# Patient Record
Sex: FEMALE | Race: WHITE | NOT HISPANIC OR LATINO | Employment: OTHER | URBAN - METROPOLITAN AREA
[De-identification: names, ages, dates, MRNs, and addresses within clinical notes are randomized per-mention and may not be internally consistent; named-entity substitution may affect disease eponyms.]

---

## 2017-04-27 ENCOUNTER — ALLSCRIPTS OFFICE VISIT (OUTPATIENT)
Dept: OTHER | Facility: OTHER | Age: 72
End: 2017-04-27

## 2017-08-30 ENCOUNTER — ALLSCRIPTS OFFICE VISIT (OUTPATIENT)
Dept: OTHER | Facility: OTHER | Age: 72
End: 2017-08-30

## 2017-08-30 DIAGNOSIS — I35.9 NONRHEUMATIC AORTIC VALVE DISORDER: ICD-10-CM

## 2017-08-30 DIAGNOSIS — I44.7 LEFT BUNDLE-BRANCH BLOCK: ICD-10-CM

## 2017-08-30 DIAGNOSIS — Z95.1 PRESENCE OF AORTOCORONARY BYPASS GRAFT: ICD-10-CM

## 2017-08-30 DIAGNOSIS — I25.10 ATHEROSCLEROTIC HEART DISEASE OF NATIVE CORONARY ARTERY WITHOUT ANGINA PECTORIS: ICD-10-CM

## 2017-09-28 ENCOUNTER — GENERIC CONVERSION - ENCOUNTER (OUTPATIENT)
Dept: OTHER | Facility: OTHER | Age: 72
End: 2017-09-28

## 2017-09-28 ENCOUNTER — HOSPITAL ENCOUNTER (OUTPATIENT)
Dept: NON INVASIVE DIAGNOSTICS | Facility: CLINIC | Age: 72
Discharge: HOME | End: 2017-09-28
Payer: COMMERCIAL

## 2017-09-28 DIAGNOSIS — I44.7 LEFT BUNDLE-BRANCH BLOCK: ICD-10-CM

## 2017-09-28 DIAGNOSIS — I35.9 NONRHEUMATIC AORTIC VALVE DISORDER: ICD-10-CM

## 2017-09-28 DIAGNOSIS — I25.10 ATHEROSCLEROTIC HEART DISEASE OF NATIVE CORONARY ARTERY WITHOUT ANGINA PECTORIS: ICD-10-CM

## 2017-09-28 DIAGNOSIS — Z95.1 PRESENCE OF AORTOCORONARY BYPASS GRAFT: ICD-10-CM

## 2017-09-28 PROCEDURE — 93306 TTE W/DOPPLER COMPLETE: CPT

## 2017-10-25 NOTE — PROGRESS NOTES
Assessment  Assessed    1  Arteriosclerotic cardiovascular disease (429 2,440 9) (I25 10)   2  Abnormal glucose (790 29) (R73 09)   3  Abdominal bruit (785 9) (R09 89)   4  Hyperlipidemia (272 4) (E78 5)   5  Hypertension (401 9) (I10)   6  S/P CABG (coronary artery bypass graft) (V45 81) (Z95 1)   7  Left bundle-branch block (426 3) (I44 7)    Plan  Aortic valve disorder, Arteriosclerotic cardiovascular disease, Left bundle-branch block,  S/P CABG (coronary artery bypass graft)    · ECHO COMPLETE WITH CONTRAST IF INDICATED; Status:Need Information - Financial  Authorization; Requested for:[id number];    Perform:[facility]; Due:[id number]; Ordered; For:Aortic valve disorder, Arteriosclerotic cardiovascular disease, Left bundle-branch block, S/P CABG (coronary artery bypass graft); Ordered By:Chris Yeboah; Unlinked    · Aspirin  MG Oral Tablet Delayed Release   Dispense: 0 Days ; #:0 Tablet Delayed Release; Refill: 0; MARIANA = Y; Record; Last Updated By: Miladis Mcguire; 8/30/2017 11:02:23 AM    Discussion/Summary  Cardiology Discussion Summary Free Text Note Form [facility]:   Patient is cardiovascularly stable  No CHF, angina or symptomatic ectopy   Has asymptomatic left bundle branch block reviewed may-2016 stress jenifer-t which showed no provokable ischemia no significant arrhythmia normal LV contractile function--- recent chest x-ray described marked cardiomegaly will check echo to assess chamber sizes as well as aortic murmurrecent vascular study demonstrating 50-75% stenosis in the aorta-with no aneurysm, normal ABIs bilaterally 60% right renal stenosis-continue risk factor modification patient has no claudication symptomsdoes suffer with anxiety and depression-and has been advised to consider Paxil many times in the past which she has not done--she will discuss this with Dr Norah Kelly monitor her blood pressure sugars regular exercise reduce carbohydrate ingestion  weight regular exercisestudies reviewed with patient, medications reviewed and possible side effects discussed  Continue present medical regimen as prescribed  Continue risk factor modification  All questions answered  Safety measures reviewed  Patient advised to report any problems prompting medical attention  Discussed concepts of atherosclerosis, signs and symptoms of cardiac disease  Optimize weight, regular exercise and follow up with appropriate specialists as discussed  Return for follow up visit in 4 months or earlier if needed  Reviewed concepts of valvular heart disease arrhythmia signs and symptoms of CAD CHF all questions answered safety measures reviewed routine follow up  up with appropriate specialists  Follows with PCP, Dr Elisha Odonnell with neurology for tremor if desired regular blood tests and cancer testing with Dr Slime Pace monitoring sugars lipids etc  follow-up with GI with gastritis safety measures reviewed weight loss reviewed emotional support provided  risk factor modification strategies reminded patient to avoid excess carbohydrates follow-up with Dr Slime Pace closely for monitoring sugars and lipids reviewed signs and symptoms of heart disease in detail patient will report any problems promptly to medical attention      Counseling Documentation With Imm: The patient was counseled regarding  Chief Complaint  Chief Complaint Free Text Note Form: Patient presents with follow up cardiovascular evaluation  Known history of CAHD-status post bypass, HTN, HPL and LBBB, Stenosis of the aortic bifurcation asymptomatic     Chief Complaint Chronic Condition [facility]: Patient is here today for follow up of chronic conditions described in HPI  History of Present Illness  Cardiology HPI Free Text Note Form [facility]: Patient is cardiovascular stable--, asymptomatic and active    No angina, CHF, palpitations, syncope, seizures, shortness of breath wheezes hemoptysis weight loss anorexia cough CVA/TIA, dizziness, lightheadedness, amaurosis, orthostasis, claudication myositis or edema  No urinary or bowel complaints  No rashes, fevers, arrhythmic symptoms, or thromboembolic symptoms  No PND  Had previous ultrasound of her aorta 2016 demonstrating 50-75% asymptomatic stenosis of the aortic bifurcation with normal ABIs and asymptomatic 60% right renal stenosis-patient denies claudication  does have arthritis of her back was put on Fosamax for osteoporosis by PCP-  followed by Dr Adriane Magana and had recent blood tests April 2017 with A1c of 6 4 cholesterol 147 LDL 61 triglycerides 231 renal function LFTs look normal had CAT scan of the chest by Dr Adriane Magana recently-demonstrating 1 2 cm lymph node-with no pulmonary masses--patient was sent to pulmonary Dr Yusra Ron is following this up--- she also had chest x-ray showing marked cardiomegaly-will be checking echo for LV size with history of CAD as well as to evaluate aortic murmur          Had May 2016 stress echo-with no provokable ischemia and no significant arrhythmia No urinary or bowel complaints  No rashes, fevers, arrhythmic symptoms, or thromboembolic symptoms  No PND  Having some chronic anxiety and depression about the obituaries- too many young people dying, won't go to [place] because too many young people dying- reminded to get prescription of Paxil that was offered by Dr Adriane Magana if needed  has a long-standing history of anxiety and depressive tendencies--had been prescribed Paxil the past but was too afraid to try it- concepts of antidepressants were reviewed in detail patient will discuss t this medicine from her PCP Dr Adriane Magana  has history of left bundle branch blockno history of bradycardia or heart block -- no arrhythmic symptoms syncope lightheadedness dizziness exercise intolerance physically active  is status post cholecystectomy  reports that her [de-identified] yo brother had CABG in [place]  She also reports that her younger sister recently had AAA repair   Patient had no aortic aneurysm but asymptomatic stenosis of aortic bifurcation was normal ABIs  in 2012 noted gastritis  Patient followsup with GI  Recent EGD in 2016 with gastritis on PPIs    with Dr Jean Pierre Tipton for medical evaluations had recent lab tests -including good cholesterol sugars are also monitored which were good, saw Dr Duane Muslim with diagnosis of morphea  of CAD, status post bypass, , history of left bundle branch block- no arrhythmic or thromboembolic symptoms no CHF PND edema orthopnea no arrhythmic symptoms dizziness lightheadedness bradycardia thromboembolism claudication orthostasis myositis PND edema orthopneatest  showed no ischemia  Patient had echo stress test 2014 with no ischemia  of hyperglycemia discussed dietary restriction of carbohydrates  No polyuria, polydipsia Blood pressures been good  Not smoking  but former  No wheezes, hemoptysis, night sweats CAT scan of the chest in  with calcification of the coronary arteries  , with trace, MR, TR aortic gradient 5 EF 55%  No CHF  [ CAD, status post bypass, , history of left bundle branch block, Anxiety, depression, hypertension, hyperlipidemia bypass, COPD, DJD, tremor, diabetes, cholecystectomy, appendectomy, hysterectomy ]  [ Positive family CAD ]  [ No alcohol abuse  Not presently smoking  Stopped in  No drug abuse  quit approximately 10 years ]  of hyperglycemia in the past recent hemoglobin A1c 6 4 normal patient is watching her carbohydrates- and weight -reminded to get regular ophthalmology podiatry followup no polyuria polydipsia- in followup with Dr Jean Pierre Tipton  was previously seen by Dr Priscille Seip does    15 years ago close with her daughter  tolerating aspirin to 81 mg with history of   GI upset no bleedingclaudication with exercise      Review of Systems  Cardiology Female ROS:     Cardiac: No complaints of chest pain, no palpitations, no fainting -- and-- as noted in HPI     Skin: No complaints of nonhealing sores or skin rash  Genitourinary: No complaints of recurrent urinary tract infections, frequent urination at night, difficult urination, blood in urine, kidney stones, loss of bladder control, kidney problems, denies any birth control or hormone replacement, is not post menopausal, not currently pregnant  Psychological: anxiety, but-- as noted in HPI-- and-- no depression  General: No complaints of trouble sleeping, lack of energy, fatigue, appetite changes, weight changes, fever, frequent infections, or night sweats  Respiratory: No complaints of shortness of breath, cough with sputum, or wheezing  HEENT: No complaints of serious problems, hearing problems, nose problems, throat problems, or snoring  Gastrointestinal: as noted in HPI-- and-- no heartburn-- History of gastritis  Hematologic: No complaints of bleeding disorders, anemia, blood clots, or excessive brusing  Neurological: No complaints of numbness, tingling, dizziness, weakness, seizures, headaches, syncope or fainting, AM fatigue, daytime sleepiness, no witnessed apnea episodes  Musculoskeletal: arthritis      Active Problems  Problems    1  Abdominal bruit (785 9) (R09 89)   2  Abdominal pain, epigastric (789 06) (R10 13)   3  Abnormal glucose (790 29) (R73 09)   4  Anxiety (300 00) (F41 9)   5  Anxiety disorder (300 00) (F41 9)   6  Aortic valve disorder (424 1) (I35 9)   7  Arteriosclerotic cardiovascular disease (429 2,440 9) (I25 10)   8  Atherosclerosis of coronary artery bypass graft without angina pectoris (414 05) (I25 810)   9  Benign essential hypertension (401 1) (I10)   10  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)   11  Depression (311) (F32 9)   12  Dyspepsia (536 8) (R10 13)   13  Dyspnea on exertion (786 09) (R06 09)   14  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)   15  Gastritis (535 50) (K29 70)   16  Hypercholesterolemia (272 0) (E78 00)   17  Hyperlipidemia (272 4) (E78 5)   18   Hypertension (401  9) (I10)   19  Left bundle-branch block (426 3) (I44 7)   20  Mild nausea (787 02) (R11 0)   21  Mitral valve disorder (424 0) (I05 9)   22  S/P CABG (coronary artery bypass graft) (V45 81) (Z95 1)   23  Tremor (781 0) (R25 1)    Past Medical History  Problems    1  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)   2  History of Chest pain (786 50) (R07 9)   3  History of COPD (chronic obstructive pulmonary disease) (496) (J44 9)   4  History of Coronary artery disease (414 00) (I25 10)   5  History of DJD (degenerative joint disease) (715 90) (M19 90)   6  History of Gastroduodenitis (535 50)   7  History of aortic valve disorder (V12 59) (Z86 79)   8  History of chest pain (V13 89) (Z87 898)   9  History of chronic obstructive lung disease (V12 69) (Z87 09)   10  History of gastritis (V12 79) (Z87 19)   11  History of heartburn (V12 79) (Z87 898)   12  History of hematuria (V13 09) (Z87 448)   13  History of nausea (V12 79) (Z87 898)   14  History of shortness of breath (V13 89) (Z87 898)   15  Hyperlipidemia (272 4) (E78 5)   16  Hypertension (401 9) (I10)   17  History of LBBB (left bundle branch block) (426 3) (I44 7)   18  History of Lymph Nodes Enlarged (785 6)   19  History of Need for prophylactic vaccination and inoculation against influenza (V04 81)    (Z23)   20  History of Osteoporosis (733 00) (M81 0)   21  History of Type 2 Diabetes Mellitus - Uncomplicated, Controlled (250 00)  Active Problems And Past Medical History Reviewed: The active problems and past medical history were reviewed and updated today  Surgical History  Problems    1  History of Appendectomy   2  History of CABG   3  History of Cardiac Cath Procedure Outcome: Successful   4  History of Cholecystectomy   5  History of Hysterectomy  Surgical History Reviewed: The surgical history was reviewed and updated today  Family History  Sister    1  Family history of abdominal aortic aneurysm (V17 49) (Z82 49)  Brother    2   Family history of Carotid artery disease  Family History    3  Family history of Coronary Artery Disease (V17 49)  Family History Reviewed: The family history was reviewed and updated today  Social History  Problems    · Denied: History of Alcohol Use (History)   · Unknown If Ever Smoked  Social History Reviewed: The social history was reviewed and updated today  The social history was reviewed and is unchanged  Current Meds   1  Alendronate Sodium 70 MG Oral Tablet; TAKE 1 TABLET ONCE WEEKLY Recorded   2  Aspir-81 81 MG Oral Tablet Delayed Release; Take 1 tablet daily Recorded   3  Aspirin  MG Oral Tablet Delayed Release; Take 1 tablet daily Recorded   4  Atenolol 50 MG Oral Tablet; take one tablet by mouth every day; Therapy: 33RYA3305 to (Evaluate:69Scg9145)  Requested for: 11ISI2013; Last   Rx:[id number] Ordered   5  Lisinopril-Hydrochlorothiazide 20-25 MG Oral Tablet; Take 1 tablet daily Recorded   6  LORazepam 1 MG Oral Tablet (Ativan); TAKE 1 TO 2 TABLET BY MOUTH EVERY DAY AS   NEEDED; Therapy: 65HLA2458 to (77 725 753)  Requested for: 01Tbs9539; Last   Rx:[id number] Ordered   7  Omega 3 1200 MG Oral Capsule; Take as directed Recorded   8  Simvastatin 40 MG Oral Tablet; Take 1 tablet daily Recorded  Medication List Reviewed: The medication list was reviewed and updated today  Allergies  Medication    1  No Known Drug Allergies    Vitals  Vital Signs    Recorded: 06Qur1653 11:02AM   Heart Rate 64   Systolic 177   Diastolic 70   Height 5 ft 1 in   Weight 141 lb 3 04 oz   BMI Calculated 26 68   BSA Calculated 1 63     Physical Exam    Constitutional   General appearance: No acute distress, well appearing and well nourished  -- No acute distress anxious well-developed well-nourished female  Eyes   Conjunctiva and Sclera examination: Conjunctiva pink, sclera anicteric  Ears, Nose, Mouth, and Throat - External inspection of ears and nose: Normal without deformities or discharge  -- Nasal mucosa, septum, and turbinates: Normal, no edema or discharge  -- Oropharynx: Clear, nares are clear, mucous membranes are moist    Neck   Neck and thyroid: Normal, supple, trachea midline, no thyromegaly  Pulmonary   Respiratory effort: No increased work of breathing or signs of respiratory distress  Auscultation of lungs: Clear to auscultation, no rales, no rhonchi, no wheezing, good air movement  Cardiovascular   Palpation of heart: Normal PMI, no thrills  Auscultation of heart: Abnormal  -- Split S2  2/6 ASHLY LSB and apex  Carotid pulses: Normal, 2+ bilaterally  Abdominal aorta: Abnormal  -- lower abdominal aortic bruit  Femoral pulses: Abnormal  -- Bilateral femoral bruits pulses +2+2  Pedal pulses: Normal, 2+ bilaterally  Examination of extremities for edema and/or varicosities: Normal     Chest -   Abnormal  -- Well-healed median sternotomy  Abdomen   Abdomen: Non-tender and no distention  Liver and spleen: No hepatomegaly or splenomegaly  Musculoskeletal Gait and station: Normal gait  -- Digits and nails: Normal without clubbing or cyanosis  -- Inspection/palpation of joints, bones, and muscles: Normal, ROM normal     Skin - Skin and subcutaneous tissue: Normal without rashes or lesions  Skin is warm and well perfused, normal turgor  Neurologic - Speech: Normal     Psychiatric - Orientation to person, place, and time: Normal -- Mood and affect: Normal    Additional Findings - Mild tremor noted  Health Management  Health Maintenance   Influenza (Split); every 1 year; Last 37MZM2614; Next Due: 46GGL3519;  Overdue    Signatures   Electronically signed by : JAIR Saha ; Aug 30 2017 11:35AM EST                       (Author)

## 2018-01-09 NOTE — RESULT NOTES
Verified Results  ECHO COMPLETE WITH CONTRAST IF INDICATED 2017 08:54AM Thyra  Order Number: [id number]    - Patient Instructions: To schedule this appointment, please contact Central Scheduling at [phone]  Test Name Result Flag Reference   ECHO COMPLETE WITH CONTRAST IF INDICATED (Report)     [address] [facility], [address] [facility]   [phone]     Transthoracic Echocardiogram   2D, M-mode, and Color Doppler     Study date: 28-Sep-2017     Patient: Janie West   MR number: [id number]   Account number: [de-identified]   : 1945   Age: 74 years   Gender: Female   Status: Outpatient   Location: [facility]   Height: 61 in   Weight: 141 lb   BP: 138/ 70 mmHg     Indications: CAD  Diagnoses: I25 10 - Atherosclerotic heart disease of native coronary artery without angina pectoris     Sonographer: Richey RCS   Interpreting Physician: Patricio Espitia MD   Primary Physician: Aneta Weaver MD   Referring Physician: Andie Kaplan MD   Group: [facility]     SUMMARY     LEFT VENTRICLE:   Systolic function was normal  Ejection fraction was estimated in the range of 55 % to 65 %  There were no regional wall motion abnormalities  Doppler parameters were consistent with abnormal left ventricular relaxation (grade 1 diastolic dysfunction)  MITRAL VALVE:   There was mild regurgitation  AORTIC VALVE:   There was trace regurgitation  TRICUSPID VALVE:   There was mild regurgitation  PULMONIC VALVE:   There was mild regurgitation  HISTORY: PRIOR HISTORY: Left bundle branch block  Hyperlipidemia  Risk factors: hypertension, medication-treated hypercholesterolemia, and a family history of coronary artery disease  Chronic lung disease  PROCEDURE: The study was performed in the [address] [place] [address]  This was a routine study  The transthoracic approach was used  The study included complete 2D imaging, M-mode, and color Doppler  The heart rate was 93 bpm, at the   start of the study  Images were obtained from the parasternal, apical, subcostal, and suprasternal notch acoustic windows  Image quality was adequate  LEFT VENTRICLE: Size was normal  Systolic function was normal  Ejection fraction was estimated in the range of 55 % to 65 %  There were no regional wall motion abnormalities  Wall thickness was normal  DOPPLER: Doppler parameters were   consistent with abnormal left ventricular relaxation (grade 1 diastolic dysfunction)  RIGHT VENTRICLE: The size was normal  Systolic function was normal  Wall thickness was normal      LEFT ATRIUM: Size was normal      RIGHT ATRIUM: Size was normal      MITRAL VALVE: Valve structure was normal  There was normal leaflet separation  DOPPLER: The transmitral velocity was within the normal range  There was no evidence for stenosis  There was mild regurgitation  AORTIC VALVE: The valve was trileaflet  Leaflets exhibited normal thickness and normal cuspal separation  DOPPLER: Transaortic velocity was within the normal range  There was no evidence for stenosis  There was trace regurgitation  TRICUSPID VALVE: The valve structure was normal  There was normal leaflet separation  DOPPLER: The transtricuspid velocity was within the normal range  There was no evidence for stenosis  There was mild regurgitation  PULMONIC VALVE: Leaflets exhibited normal thickness, no calcification, and normal cuspal separation  DOPPLER: The transpulmonic velocity was within the normal range  There was mild regurgitation  PERICARDIUM: There was no pericardial effusion  The pericardium was normal in appearance  AORTA: The root exhibited normal size  SYSTEMIC VEINS: IVC: The inferior vena cava was normal in size and course   Respirophasic changes were normal      SYSTEM MEASUREMENT TABLES     Apical four chamber   4 chamber Left Atrium Volume Index; Planimetry; End Systole; Apical four chamber;: 12 71 cm2   Left Ventricular Diastolic Area; Method of Disks, Single Plane; End Diastole; Apical four chamber;: 24 72 cm2   Left Ventricular Ejection Fraction; Method of Disks, Single Plane; Apical four chamber;: 62 2 %   Left Ventricular systolic Area; Method of Disks, Single Plane; End Systole; Apical four chamber;: 12 99 cm2   Right Atrium Systolic Area; Planimetry; End Systole; Apical four chamber;: 12 63 cm2   Right Ventricular Internal Diastolic Dimension; End Diastole; Apical four chamber;: 26 mm   TAPSE: 15 1 mm     Unspecified Scan Mode   Aortic Root Diameter; End Systole;: 30 5 mm   Aortic valve Area; Continuity Equation by Velocity Time Integral; Systole;: 2 65 cm2   Cardiovascular Orifice Diameter; End Systole;: 19 7 mm   Gradient Pressure, Peak; Simplified Bernoulli; Antegrade Flow; Systole;: 8 5 mm[Hg]   Gradient pressure, average; Simplified Bernoulli; Antegrade Flow; Systole;: 4 7 mm[Hg]   Left Atrium to Aortic Root Ratio;: 1 2   Left atrial diameter; End Diastole;: 36 5 mm   Cardiac Output; Teichholz; Systole;: 2 21 L/min   Heart rate; Teichholz;: 86 {H  B }/min   Interventricular Septum Diastolic Thickness; Teichholz; End Diastole;: 8 9 mm   Left Ventricle Internal End Diastolic Dimension; Teichholz;: 32 5 mm   Left Ventricle Internal Systolic Dimension; Teichholz; End Systole;: 22 3 mm   Left Ventricle Mass; Mass AVCube with Teichholz; End Diastole;: 84 g   Left Ventricle Posterior Wall Diastolic Thickness; Teichholz; End Diastole;: 9 8 mm   Left Ventricular Ejection Fraction; Teichholz;: 60 5 %   Left Ventricular End Diastolic Volume; Teichholz;: 42 5 ml   Left Ventricular End Systolic Volume; Teichholz;: 16 8 ml   Left Ventricular Fractional Shortening;: 31 4 %   Stroke volume;  Teichholz; Systole;: 25 7 ml   Mitral Valve Area; Area by Pressure Half-Time; Systole;: 5 cm2   Mitral Valve E to A Ratio; Systole;: 0 67   Pressure half time; Diastole;: 0 04 s   Maximum Tricuspid valve regurgitation pressure gradient; Regurgitant Flow; Systole;: 23 2 mm[Hg]     [facility]     Prepared and electronically signed by     Curt Ferro MD   Signed [id number] 17:54:05

## 2018-01-12 VITALS
BODY MASS INDEX: 26.66 KG/M2 | WEIGHT: 141.19 LBS | HEART RATE: 64 BPM | HEIGHT: 61 IN | SYSTOLIC BLOOD PRESSURE: 138 MMHG | DIASTOLIC BLOOD PRESSURE: 70 MMHG

## 2018-01-13 VITALS
DIASTOLIC BLOOD PRESSURE: 64 MMHG | SYSTOLIC BLOOD PRESSURE: 120 MMHG | HEART RATE: 64 BPM | HEIGHT: 61 IN | BODY MASS INDEX: 27.38 KG/M2 | WEIGHT: 145 LBS

## 2018-01-23 ENCOUNTER — ALLSCRIPTS OFFICE VISIT (OUTPATIENT)
Dept: OTHER | Facility: OTHER | Age: 73
End: 2018-01-23

## 2018-01-24 NOTE — PROGRESS NOTES
Assessment  Assessed    1  Arteriosclerotic cardiovascular disease (429 2,440 9) (I25 10)   2  S/P CABG (coronary artery bypass graft) (V45 81) (Z95 1)   3  Hypertension (401 9) (I10)   4  Hyperlipidemia (272 4) (E78 5)   5  Left bundle-branch block (426 3) (I44 7)   6  Diastolic dysfunction (030 1) (I51 9)   7  Mitral regurgitation (424 0) (I34 0)   8  Tricuspid regurgitation (397 0) (I07 1)   9  Renal artery stenosis (440 1) (I70 1)    Discussion/Summary  Cardiology Discussion Summary Free Text Note Form [facility]:   Patient with multiple medical problems who seems to be doing reasonably well from cardiac standpoint  Continue aggressive risk factor modification and therapeutic lifestyle changes  Low salt, low calorie, low fat, low cholesterol diet with regular exercise and to optimize weight  Previous studies were reviewed  Medications were reviewed and potential side effects discussed  Discussed concepts of atherosclerosis, including signs and symptoms of cardiac disease  Safety measures were reviewed  Questions were entertained and answered  Patient was advised to report any problem requiring medical attention  Follow up with appropriate specialists and lab work as discussed  Return for follow up visit as scheduled or earlier, if needed  Thank you for allowing me to participate in the care and evaluation of your patient  Should you have any questions, please feel free to contact me  Counseling Documentation With Imm: The patient was counseled regarding  Chief Complaint  Chief Complaint Chronic Condition [facility]: Patient is here today for follow up of chronic conditions described in HPI  History of Present Illness  Cardiology HPI Free Text Note Form [facility]: Patient is cardiovascular stable, asymptomatic and active   Denies chest pain /pressure / tightness, palpitations, lightheadedness, syncope, SOB, swelling feet, orthopnea, PND    Had ultrasound of her aorta July 2016 demonstrating 50-75% stenosis of the aortic bifurcation with normal ABIs and 60% right renal stenosis  Patient denies claudication    Had May 2016 stress echo with no provocable ischemia and no significant arrhythmia    Patient has a long-standing history of anxiety and depressive tendencies--had been prescribed Paxil in the past    Patient has history of left bundle branch block, no history of bradycardia or heart block no arrhythmic symptoms syncope lightheadedness dizziness exercise intolerance     CAD S/P CABG in 1989 - stable and doing well      Review of Systems  Cardiology Female ROS:     Cardiac: No complaints of chest pain, no palpitations, no fainting  and as noted in HPI  Skin: No complaints of nonhealing sores or skin rash  Genitourinary: No complaints of recurrent urinary tract infections, frequent urination at night, difficult urination, blood in urine, kidney stones, loss of bladder control, kidney problems, denies any birth control or hormone replacement, is not post menopausal, not currently pregnant  Psychological: anxiety, but as noted in HPI and no depression  General: No complaints of trouble sleeping, lack of energy, fatigue, appetite changes, weight changes, fever, frequent infections, or night sweats  Respiratory: No complaints of shortness of breath, cough with sputum, or wheezing  HEENT: No complaints of serious problems, hearing problems, nose problems, throat problems, or snoring  Gastrointestinal: as noted in HPI and no heartburn History of gastritis  Hematologic: No complaints of bleeding disorders, anemia, blood clots, or excessive brusing  Neurological: No complaints of numbness, tingling, dizziness, weakness, seizures, headaches, syncope or fainting, AM fatigue, daytime sleepiness, no witnessed apnea episodes  Musculoskeletal: arthritis       ROS Reviewed:   ROS reviewed  Active Problems  Problems    1  Abdominal bruit (785 9) (R09 89)   2   Abdominal pain, epigastric (279 06) (R10 13)   3  Abnormal glucose (790 29) (R73 09)   4  Anxiety (300 00) (F41 9)   5  Anxiety disorder (300 00) (F41 9)   6  Aortic valve disorder (424 1) (I35 9)   7  Arteriosclerotic cardiovascular disease (429 2,440 9) (I25 10)   8  Atherosclerosis of coronary artery bypass graft without angina pectoris (414 05) (I25 810)   9  Benign essential hypertension (401 1) (I10)   10  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)   11  Depression (311) (F32 9)   12  Dyspepsia (536 8) (R10 13)   13  Dyspnea on exertion (786 09) (R06 09)   14  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)   15  Hypercholesterolemia (272 0) (E78 00)   16  Hyperlipidemia (272 4) (E78 5)   17  Hypertension (401 9) (I10)   18  Left bundle-branch block (426 3) (I44 7)   19  Mild nausea (787 02) (R11 0)   20  Mitral valve disorder (424 0) (I05 9)   21  S/P CABG (coronary artery bypass graft) (V45 81) (Z95 1)   22  Tremor (781 0) (R25 1)    Past Medical History  Problems    1  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)   2  History of Chest pain (786 50) (R07 9)   3  History of COPD (chronic obstructive pulmonary disease) (496) (J44 9)   4  History of Coronary artery disease (414 00) (I25 10)   5  History of DJD (degenerative joint disease) (715 90) (M19 90)   6  History of Gastroduodenitis (535 50)   7  History of aortic valve disorder (V12 59) (Z86 79)   8  History of chest pain (V13 89) (Z87 898)   9  History of chronic obstructive lung disease (V12 69) (Z87 09)   10  History of gastritis (V12 79) (Z87 19)   11  History of gastritis (V12 79) (Z87 19)   12  History of heartburn (V12 79) (Z87 898)   13  History of hematuria (V13 09) (Z87 448)   14  History of nausea (V12 79) (Z87 898)   15  History of shortness of breath (V13 89) (Z87 898)   16  Hyperlipidemia (272 4) (E78 5)   17  Hypertension (401 9) (I10)   18  History of LBBB (left bundle branch block) (426 3) (I44 7)   19  History of Lymph Nodes Enlarged (785 6)   20   History of Need for prophylactic vaccination and inoculation against influenza (V04 81)    (Z23)   21  History of Osteoporosis (733 00) (M81 0)   22  History of Type 2 Diabetes Mellitus - Uncomplicated, Controlled (250 00)  Active Problems And Past Medical History Reviewed: The active problems and past medical history were reviewed and updated today  Surgical History  Problems    1  History of Appendectomy   2  History of CABG   3  History of Cardiac Cath Procedure Outcome: Successful   4  History of Cholecystectomy   5  History of Hysterectomy  Surgical History Reviewed: The surgical history was reviewed and updated today  Family History  Sister    1  Family history of abdominal aortic aneurysm (V17 49) (Z82 49)  Brother    2  Family history of Carotid artery disease  Family History    3  Family history of Coronary Artery Disease (V17 49)  Family History Reviewed: The family history was reviewed and updated today  Social History  Problems    · Denied: History of Alcohol Use (History)   · Unknown If Ever Smoked  Social History Reviewed: The social history was reviewed and updated today  The social history was reviewed and is unchanged  Current Meds   1  Aspir-81 81 MG Oral Tablet Delayed Release; Take 1 tablet daily Recorded   2  Atenolol 50 MG Oral Tablet; take one tablet by mouth every day; Therapy: [id number] to (Lilia Irwin)  Requested for: 64WQL9245; Last   Rx:27Nov2017; Status: ACTIVE - Retrospective By Protocol Authorization, Transmit to   Pharmacy - Awaiting Verification Ordered   3  Lisinopril-Hydrochlorothiazide 20-25 MG Oral Tablet; Take 1 tablet daily Recorded   4  LORazepam 1 MG Oral Tablet (Ativan); TAKE 1 TO 2 TABLET BY MOUTH EVERY DAY AS   NEEDED; Therapy: 67YUZ2823 to ([id number])  Requested for: 08CWU5069; Last   Rx:11Oct2017 Ordered   5  Omega 3 1200 MG Oral Capsule; Take as directed Recorded   6  Simvastatin 40 MG Oral Tablet;  Take 1 tablet daily Recorded  Medication List Reviewed: The medication list was reviewed and updated today  Allergies  Medication    1  No Known Drug Allergies    Vitals  Vital Signs    Recorded: 62XHZ3068 10:30AM   Heart Rate 58   Systolic 268   Diastolic 80   Height 5 ft 1 in   Weight 142 lb    BMI Calculated 26 83   BSA Calculated 1 63   O2 Saturation 98     Physical Exam    Constitutional   General appearance: No acute distress, well appearing and well nourished  Anxious  Eyes   Conjunctiva and Sclera examination: Conjunctiva pink, sclera anicteric  Ears, Nose, Mouth, and Throat - External inspection of ears and nose: Normal without deformities or discharge  Oropharynx: Clear, nares are clear, mucous membranes are moist    Neck   Neck and thyroid: Normal, supple, trachea midline, no thyromegaly  Pulmonary   Respiratory effort: No increased work of breathing or signs of respiratory distress  Auscultation of lungs: Clear to auscultation, no rales, no rhonchi, no wheezing, good air movement  Cardiovascular   Palpation of heart: Normal PMI, no thrills  Auscultation of heart: Normal rate and rhythm, normal S1 and S2, no murmurs  Carotid pulses: Normal, 2+ bilaterally  Abdominal aorta: Abnormal   lower abdominal aortic bruit  Femoral pulses: Abnormal   Bilateral femoral bruits pulses +2+2  Pedal pulses: Normal, 2+ bilaterally  Examination of extremities for edema and/or varicosities: Normal     Chest -   Abnormal   Well-healed median sternotomy  Abdomen   Abdomen: Non-tender and no distention  Liver and spleen: No hepatomegaly or splenomegaly  Musculoskeletal Gait and station: Normal gait  Digits and nails: Normal without clubbing or cyanosis  Skin - Skin and subcutaneous tissue: Normal without rashes or lesions  Skin is warm and well perfused, normal turgor      Neurologic - Speech: Normal     Psychiatric - Orientation to person, place, and time: Normal  Mood and affect: Normal    Additional Findings - Mild tremor noted  Health Management  Health Maintenance   Influenza (Split); every 1 year; Last [id number]; Next Due: [id number];  Overdue    Signatures   Electronically signed by : JAIR Cruz ; Jan 23 2018 11:05AM EST                       (Author)

## 2018-05-29 ENCOUNTER — TELEPHONE (OUTPATIENT)
Dept: CARDIOLOGY CLINIC | Facility: CLINIC | Age: 73
End: 2018-05-29

## 2018-05-29 NOTE — TELEPHONE ENCOUNTER
Answering service call-       Message # [phone] 2018 07:37p [NM]   TO: Deepthi Lewis Ogilvie Name: Jolene Meier   PHONE: [phone]   ----------------------------------------------------------------------   Malcom   Pt Jovanni Carias   :41   Emerg?:Yes   Msg:Took the wrong med & a double dose & needs to know what   she should do next     (Message Delivered)   ------------ SUNITA Parks - U P------------- :   2018 07:40p NM   Paged pt info to Dr Yisel Santiago @ 7:40p

## 2018-06-22 RX ORDER — SIMVASTATIN 40 MG
1 TABLET ORAL DAILY
COMMUNITY
End: 2020-11-19 | Stop reason: SDUPTHER

## 2018-06-22 RX ORDER — LISINOPRIL AND HYDROCHLOROTHIAZIDE 25; 20 MG/1; MG/1
1 TABLET ORAL
COMMUNITY
Start: 2018-01-22 | End: 2020-10-27

## 2018-06-22 RX ORDER — ATENOLOL 50 MG/1
50 TABLET ORAL
COMMUNITY
Start: 2018-02-14

## 2018-06-22 RX ORDER — LORAZEPAM 1 MG/1
1 TABLET ORAL
COMMUNITY
Start: 2018-05-14 | End: 2022-04-14 | Stop reason: ALTCHOICE

## 2018-06-22 RX ORDER — CALCIUM CARBONATE/VITAMIN D3 600 MG-10
TABLET ORAL
COMMUNITY

## 2018-06-22 RX ORDER — ASPIRIN 81 MG/1
1 TABLET ORAL DAILY
COMMUNITY

## 2018-07-19 ENCOUNTER — OFFICE VISIT (OUTPATIENT)
Dept: GASTROENTEROLOGY | Facility: CLINIC | Age: 73
End: 2018-07-19
Payer: COMMERCIAL

## 2018-07-19 VITALS
WEIGHT: 138 LBS | DIASTOLIC BLOOD PRESSURE: 70 MMHG | HEART RATE: 64 BPM | BODY MASS INDEX: 26.06 KG/M2 | HEIGHT: 61 IN | SYSTOLIC BLOOD PRESSURE: 148 MMHG

## 2018-07-19 DIAGNOSIS — R13.10 DYSPHAGIA, UNSPECIFIED TYPE: Primary | ICD-10-CM

## 2018-07-19 PROCEDURE — 99213 OFFICE O/P EST LOW 20 MIN: CPT | Performed by: INTERNAL MEDICINE

## 2018-07-19 RX ORDER — MELATONIN
1000 DAILY
COMMUNITY

## 2018-07-19 RX ORDER — FAMOTIDINE 20 MG/1
20 TABLET, FILM COATED ORAL 2 TIMES DAILY
Qty: 90 TABLET | Refills: 3 | Status: SHIPPED | OUTPATIENT
Start: 2018-07-19 | End: 2019-05-01

## 2018-07-19 NOTE — PROGRESS NOTES
[facility]      Chief Complaint: dysphagia    HPI:  Rodriguez Ortiz is a 74 y o   female who presents today with dysphagia  She believes that her allergies are exacerbating her difficulty swallowing and she is experiencing postnasal drip  She reports that there are times when her food gets stuck in her chest  A few years ago we did perform an EGD which was unremarkable  Denies losing weight, loss of appetite, chest pain, black stools  She has been eating well and has gained weight  There are no alarm symptoms present  She has been under a lot of stress with her daughter's diagnosis of breast cancer  Review of Systems:   Constitutional: No fever or chills, feels well, no tiredness, no recent weight gain or weight loss  HENT: No complaints of earache, no hearing loss, no nosebleeds, no nasal discharge, no sore throat, no hoarseness  Eyes: No complaints of eye pain, no red eyes, no discharge from eyes, no itchy eyes  Cardiovascular: No complaints of slow heart rate, no fast heart rate, no chest pain, no palpitations, no leg claudication, no lower extremity edema  Respiratory: No complaints of shortness of breath, no wheezing, no cough, no SOB on exertion, no orthopnea  Gastrointestinal: As noted in HPI  Genitourinary: No complaints of dysuria, no incontinence, no hesitancy, no nocturia  Musculoskeletal: No complaints of arthralgia, no myalgias, no joint swelling or stiffness, no limb pain or swelling  Neurological: No complaints of headache, no confusion, no convulsions, no numbness or tingling, no dizziness or fainting, no limb weakness, no difficulty walking  Skin: No complaints of skin rash or skin lesions, no itching, no skin wound, no dry skin  Hematological/Lymphatic: No complaints of swollen glands, does not bleed easy  Allergic/Immunologic: No immunocompromised state  Endocrine:  No complaints of polyuria, no polydipsia     Psychiatric/Behavioral: is not suicidal, no sleep disturbances, depression, no change in personality, no emotional problems  Positive for anxiety  Historical Information   Past Medical History:   Diagnosis Date    Abdominal bruit     Anxiety     Aortic valve disorder     Depression     HTN (hypertension)     Hyperlipidemia     LBBB (left bundle branch block)     Mitral valve disorder     S/P CABG (coronary artery bypass graft)     Tremor     Tricuspid regurgitation      Past Surgical History:   Procedure Laterality Date    APPENDECTOMY      CARDIAC CATHETERIZATION      CHOLECYSTECTOMY      CORONARY ARTERY BYPASS GRAFT      HYSTERECTOMY       Social History   History   Alcohol use Not on file     History   Drug use: Unknown     History   Smoking Status    Not on file   Smokeless Tobacco    Not on file     No family history on file  Current Medications: has a current medication list which includes the following prescription(s): aspirin, atenolol, cholecalciferol, lisinopril-hydrochlorothiazide, lorazepam, omega 3, and simvastatin  Vital Signs: /70   Pulse 64   Ht 5' 1" (1 549 m)   Wt 62 6 kg (138 lb)   BMI 26 07 kg/m²       Physical Exam:   Constitutional  General Appearance: No acute distress, well appearing and well nourished  Head  Normocephalic  Eyes  Conjunctivae and lids: No swelling, erythema, or discharge  Pupils and irises: Equal, round and reactive to light  Ears, Nose, Mouth, and Throat  External inspection of ears and nose: Normal  Nasal mucosa, septum and turbinates: Normal without edema or erythema/   Oropharynx: Normal with no erythema, edema, exudate or lesions  Neck  Normal range of motion  Neck supple  Cardiovascular  Auscultation of the heart: Normal rate and rhythm, normal S1 and S2 without murmurs  Examination of the extremities for edema and/or varicosities: Normal  Pulmonary/Chest  Respiratory effort: No increased work of breathing or signs of respiratory distress  Auscultation of lungs: Clear to auscultation, equal breath sounds bilaterally, no wheezes, rales, no rhonchi  Abdomen  Abdomen: Non-tender, no masses  Liver and spleen: No hepatomegaly or splenomegaly  Musculoskeletal  Gait and station: normal   Digits and Nails: normal without clubbing or cyanosis  Inspection/palpation of joints, bones, and muscles: Normal  Benign tremor present  Neurological  No nystagmus or asterixis  Positive head tremor at rest  Skin  Skin and subcutaneous tissue: Normal without rashes or lesions  Lymphatic  Palpation of the lymph nodes in neck: No lymphadenopathy  Psychiatric  Orientation to person, place and time: Normal   Mood and affect: Normal          Labs:  No results found for: ALBUMIN, ALT, AST, BUN, CALCIUM, CL, CHOL, CO2, CREATININE, GFRAA, GFRNONAA, GLU, HDL, HCT, HGB, HGBA1C, LDL, MG, PHOS, PLT, K, PSA, NA, TRIG, WBC      X-Rays & Procedures:   No orders to display           ______________________________________________________________________      Assessment & Plan:     There are no diagnoses linked to this encounter  1  Dysphagia  Will start pepcid 20 mg twice daily  Will also perform EGD  Attestation:   By signing my name below, Signa Check, attest that this documentation has been prepared under the direction and in the presence of Fernandez Raymond MD  Electronically Signed: Ericka Zaragoza  07/19/18     I, Fernandez Raymond, personally performed the services described in this documentation  All medical record entries made by the ericka were at my direction and in my presence  I have reviewed the chart and discharge instructions and agree that the record reflects my personal performance and is accurate and complete    Fernandez Raymond MD  07/19/18

## 2018-07-25 ENCOUNTER — TELEPHONE (OUTPATIENT)
Dept: GASTROENTEROLOGY | Facility: CLINIC | Age: 73
End: 2018-07-25

## 2018-07-30 ENCOUNTER — OFFICE VISIT (OUTPATIENT)
Dept: CARDIOLOGY CLINIC | Facility: CLINIC | Age: 73
End: 2018-07-30
Payer: COMMERCIAL

## 2018-07-30 VITALS
DIASTOLIC BLOOD PRESSURE: 70 MMHG | OXYGEN SATURATION: 88 % | WEIGHT: 136 LBS | BODY MASS INDEX: 25.68 KG/M2 | HEIGHT: 61 IN | HEART RATE: 67 BPM | SYSTOLIC BLOOD PRESSURE: 134 MMHG

## 2018-07-30 DIAGNOSIS — I51.89 DIASTOLIC DYSFUNCTION: ICD-10-CM

## 2018-07-30 DIAGNOSIS — E78.2 MIXED HYPERLIPIDEMIA: ICD-10-CM

## 2018-07-30 DIAGNOSIS — I44.7 LBBB (LEFT BUNDLE BRANCH BLOCK): ICD-10-CM

## 2018-07-30 DIAGNOSIS — I25.10 CORONARY ARTERY DISEASE INVOLVING NATIVE HEART WITHOUT ANGINA PECTORIS, UNSPECIFIED VESSEL OR LESION TYPE: Primary | ICD-10-CM

## 2018-07-30 DIAGNOSIS — I10 ESSENTIAL HYPERTENSION: ICD-10-CM

## 2018-07-30 DIAGNOSIS — I36.1 NONRHEUMATIC TRICUSPID VALVE REGURGITATION: ICD-10-CM

## 2018-07-30 DIAGNOSIS — Z95.1 HX OF CABG: ICD-10-CM

## 2018-07-30 DIAGNOSIS — I70.1 RENAL ARTERY STENOSIS (HCC): ICD-10-CM

## 2018-07-30 DIAGNOSIS — I34.0 NONRHEUMATIC MITRAL VALVE REGURGITATION: ICD-10-CM

## 2018-07-30 PROCEDURE — 99214 OFFICE O/P EST MOD 30 MIN: CPT | Performed by: INTERNAL MEDICINE

## 2018-07-30 NOTE — PROGRESS NOTES
CARDIOLOGY OFFICE VISIT  [facility]  [address], [facility]  Tel: [phone]      NAME: Sumeet Franks  AGE: 74 y o  SEX: female  : 1945   MRN: [id number]      Chief Complaint:  Chief Complaint   Patient presents with    Follow-up     ASCVD, s/p CABG       History of Present Illness:   Patient comes for follow up  States she is doing well from cardiac stand point and denies chest pain / pressure, SOB, palpitations, lightheadedness, syncope, swelling feet, orthopnea, PND, claudication  CAD s/p CABG  -  States is doing well cardiac wise with no cardiac symptoms  Taking all medications regularly    HTN, DD -  Has been hypertensive for many years  Taking medications regularly  Denies lightheadedness, headache, medication side effects  HLP -  Has had hyperlipidemia for many years  Taking statin regularly along with diet control  Denies myalgia  PCP closely monitoring the blood work  LBBB - Patient has history of left bundle branch block, no history of bradycardia or heart block no arrhythmic symptoms syncope lightheadedness dizziness exercise intolerance     MR, TR -  Stable  Mild  Asymptomatic    Renal artery stenosis - Had ultrasound of her aorta 2016 demonstrating 50-75% stenosis of the aortic bifurcation with normal ABIs and 60% right renal stenosis   Patient denies claudication    Had May 2016 stress echo with no provocable ischemia and no significant arrhythmia    Patient has a long-standing history of anxiety and depressive tendencies--had been prescribed Paxil in the past      Past Medical History:  Past Medical History:   Diagnosis Date    Abdominal bruit     Anxiety     Aortic valve disorder     Depression     HTN (hypertension)     Hyperlipidemia     LBBB (left bundle branch block)     Mitral valve disorder     S/P CABG (coronary artery bypass graft)     Tremor     Tricuspid regurgitation          Past Surgical History:  Past Surgical History:   Procedure Laterality Date    APPENDECTOMY      CARDIAC CATHETERIZATION      CHOLECYSTECTOMY      CORONARY ARTERY BYPASS GRAFT      HYSTERECTOMY           Family History:  No family history on file  Social History:  Social History     Social History    Marital status:       Spouse name: N/A    Number of children: N/A    Years of education: N/A     Social History Main Topics    Smoking status: Never Smoker    Smokeless tobacco: Not on file    Alcohol use No    Drug use: No    Sexual activity: Not on file     Other Topics Concern    Not on file     Social History Narrative    No narrative on file         Active Problems:  Patient Active Problem List   Diagnosis    Coronary artery disease involving native heart without angina pectoris    Hx of CABG    Essential hypertension    Mixed hyperlipidemia    LBBB (left bundle branch block)    Diastolic dysfunction    Nonrheumatic mitral valve regurgitation    Nonrheumatic tricuspid valve regurgitation    Renal artery stenosis (HCC)         The following portions of the patient's history were reviewed and updated as appropriate: past medical history, past surgical history, past family history,  past social history, current medications, allergies and problem list       Review of Systems:  Constitutional: Denies fever, chills, fatigue  Eyes: Denies eye redness, eye discharge, double vision  ENT: Denies hearing loss, tinnitus, sneezing, nasal discharge, sore throat   Respiratory: Denies cough, expectoration, hemoptysis, shortness of breath  Cardiovascular: Denies chest pain, palpitations, orthopnea, PND, lower extremity swelling  Gastrointestinal: Denies abdominal pain, nausea, vomiting, hematemesis, diarrhea, bloody stools  Genito-Urinary: Denies dysuria, incontinence  Musculoskeletal: Denies back pain, joint pain, muscle pain  Neurologic: Denies confusion, lightheadedness, syncope, headache, focal weakness, sensory changes, seizures  Endocrine: Denies polyuria, polydipsia, temperature intolerance  Allergy and Immunology: Denies hives, insect bite sensitivity  Hematological and Lymphatic: Denies bleeding problems, swollen glands   Psychological: Denies depression, suicidal ideation  +anxiety  Dermatological: Denies pruritus, rash, skin lesion changes      Vitals:  Vitals:    07/30/18 1132   BP: 134/70   Pulse: 67   SpO2: (!) 88%       Body mass index is 25 7 kg/m²  Weight (last 2 days)     Date/Time   Weight    07/30/18 1132  61 7 (136)                Physical Examination:  General: Patient is not in acute distress  Awake, alert, oriented in time, place and person  Responding to commands  Head: Normocephalic  Atraumatic  Eyes: Both pupils normal sized, round and reactive to light  Nonicteric  ENT: Normal external ear canals  Nares normal, no drainage  Lips and oral mucosa normal  Neck: Supple  JVP not raised  Trachea central  No thyromegaly  Lungs: Bilateral bronchovascular breath sounds with no crackles or rhonchi  Chest wall: No tenderness  Cardiovascular: RRR  S1 and S2 normal  No murmur, rub or gallop  Gastrointestinal: Abdomen soft, nontender  No guarding or rigidity  Liver and spleen not palpable  Bowel sounds present  Neurologic: Patient is awake, alert, oriented in time, place and person  Responding to command  Moving all extremities  Integumentary:  No skin rash  Lymphatic: No cervical lymphadenopathy  Back: Symmetric   No CVA tenderness  Extremities: No clubbing, cyanosis or edema      Laboratory Results:  Cardiac testing:   Results for orders placed during the hospital encounter of 09/28/17   Echo complete with contrast if indicated    Narrative [address] [place] [address], [address] [place] [address]  [phone]    Transthoracic Echocardiogram  2D, M-mode, and Color Doppler    Study date:  28-Sep-2017    Patient: Amaya Gaming  MR number: [id number]  Account number: [de-identified]  : 1945  Age: 74 years  Gender: Female  Status: Outpatient  Location: [facility]  Height: 61 in  Weight: 141 lb  BP: 138/ 70 mmHg    Indications: CAD  Diagnoses: I25 10 - Atherosclerotic heart disease of native coronary artery without angina pectoris    Sonographer:  Alberto RCS  Interpreting Physician:  Mihir Cortés MD  Primary Physician:  Fartun Garrido MD  Referring Physician:  Morales Elaine MD  Group:  [facility]    LEFT VENTRICLE:  Systolic function was normal  Ejection fraction was estimated in the range of 55 % to 65 %  There were no regional wall motion abnormalities  Doppler parameters were consistent with abnormal left ventricular relaxation (grade 1 diastolic dysfunction)  MITRAL VALVE:  There was mild regurgitation  AORTIC VALVE:  There was trace regurgitation  TRICUSPID VALVE:  There was mild regurgitation  PULMONIC VALVE:  There was mild regurgitation  HISTORY: PRIOR HISTORY: Left bundle branch block  Hyperlipidemia  Risk factors: hypertension, medication-treated hypercholesterolemia, and a family history of coronary artery disease  Chronic lung disease  PROCEDURE: The study was performed in the [address] [place] [address]  This was a routine study  The transthoracic approach was used  The study included complete 2D imaging, M-mode, and color Doppler  The heart rate was 93 bpm, at the  start of the study  Images were obtained from the parasternal, apical, subcostal, and suprasternal notch acoustic windows  Image quality was adequate  LEFT VENTRICLE: Size was normal  Systolic function was normal  Ejection fraction was estimated in the range of 55 % to 65 %  There were no regional wall motion abnormalities   Wall thickness was normal  DOPPLER: Doppler parameters were  consistent with abnormal left ventricular relaxation (grade 1 diastolic dysfunction)  RIGHT VENTRICLE: The size was normal  Systolic function was normal  Wall thickness was normal     LEFT ATRIUM: Size was normal     RIGHT ATRIUM: Size was normal     MITRAL VALVE: Valve structure was normal  There was normal leaflet separation  DOPPLER: The transmitral velocity was within the normal range  There was no evidence for stenosis  There was mild regurgitation  AORTIC VALVE: The valve was trileaflet  Leaflets exhibited normal thickness and normal cuspal separation  DOPPLER: Transaortic velocity was within the normal range  There was no evidence for stenosis  There was trace regurgitation  TRICUSPID VALVE: The valve structure was normal  There was normal leaflet separation  DOPPLER: The transtricuspid velocity was within the normal range  There was no evidence for stenosis  There was mild regurgitation  PULMONIC VALVE: Leaflets exhibited normal thickness, no calcification, and normal cuspal separation  DOPPLER: The transpulmonic velocity was within the normal range  There was mild regurgitation  PERICARDIUM: There was no pericardial effusion  The pericardium was normal in appearance  AORTA: The root exhibited normal size  SYSTEMIC VEINS: IVC: The inferior vena cava was normal in size and course  Respirophasic changes were normal     SYSTEM MEASUREMENT TABLES    Apical four chamber  4 chamber Left Atrium Volume Index; Planimetry; End Systole; Apical four chamber;: 12 71 cm2  Left Ventricular Diastolic Area; Method of Disks, Single Plane; End Diastole; Apical four chamber;: 24 72 cm2  Left Ventricular Ejection Fraction; Method of Disks, Single Plane; Apical four chamber;: 62 2 %  Left Ventricular systolic Area; Method of Disks, Single Plane; End Systole; Apical four chamber;: 12 99 cm2  Right Atrium Systolic Area; Planimetry; End Systole; Apical four chamber;: 12 63 cm2  Right Ventricular Internal Diastolic Dimension; End Diastole;  Apical four chamber;: 26 mm  TAPSE: 15 1 mm    Unspecified Scan Mode  Aortic Root Diameter; End Systole;: 30 5 mm  Aortic valve Area; Continuity Equation by Velocity Time Integral; Systole;: 2 65 cm2  Cardiovascular Orifice Diameter; End Systole;: 19 7 mm  Gradient Pressure, Peak; Simplified Bernoulli; Antegrade Flow; Systole;: 8 5 mm[Hg]  Gradient pressure, average; Simplified Bernoulli; Antegrade Flow; Systole;: 4 7 mm[Hg]  Left Atrium to Aortic Root Ratio;: 1 2  Left atrial diameter; End Diastole;: 36 5 mm  Cardiac Output; Teichholz; Systole;: 2 21 L/min  Heart rate; Teichholz;: 86 /min  Interventricular Septum Diastolic Thickness; Teichholz; End Diastole;: 8 9 mm  Left Ventricle Internal End Diastolic Dimension; Teichholz;: 32 5 mm  Left Ventricle Internal Systolic Dimension; Teichholz; End Systole;: 22 3 mm  Left Ventricle Mass; Mass AVCube with Teichholz; End Diastole;: 84 g  Left Ventricle Posterior Wall Diastolic Thickness; Teichholz; End Diastole;: 9 8 mm  Left Ventricular Ejection Fraction; Teichholz;: 60 5 %  Left Ventricular End Diastolic Volume; Teichholz;: 42 5 ml  Left Ventricular End Systolic Volume; Teichholz;: 16 8 ml  Left Ventricular Fractional Shortening;: 31 4 %  Stroke volume;  Teichholz; Systole;: 25 7 ml  Mitral Valve Area; Area by Pressure Half-Time; Systole;: 5 cm2  Mitral Valve E to A Ratio; Systole;: 0 67  Pressure half time; Diastole;: 0 04 s  Maximum Tricuspid valve regurgitation pressure gradient; Regurgitant Flow; Systole;: 23 2 mm[Hg]    [facility]    Prepared and electronically signed by    Yuko Abarca MD  Signed 28-Sep-2017 17:54:05         Medications:    Current Outpatient Prescriptions:     aspirin (ASPIR-81) 81 mg EC tablet, Take 1 tablet by mouth daily, Disp: , Rfl:     atenolol (TENORMIN) 50 mg tablet, Take 50 mg by mouth, Disp: , Rfl:     cholecalciferol (VITAMIN D3) 1,000 units tablet, Take 1,000 Units by mouth daily, Disp: , Rfl:     famotidine (PEPCID) 20 mg tablet, Take 1 tablet (20 mg total) by mouth 2 (two) times a day, Disp: 90 tablet, Rfl: 3    lisinopril-hydrochlorothiazide (PRINZIDE,ZESTORETIC) 20-25 MG per tablet, Take 1 tablet by mouth, Disp: , Rfl:     LORazepam (ATIVAN) 1 mg tablet, Take 1 mg by mouth, Disp: , Rfl:     Omega 3 1200 MG CAPS, Take by mouth, Disp: , Rfl:     simvastatin (ZOCOR) 40 mg tablet, Take 1 tablet by mouth daily, Disp: , Rfl:       Allergies:  No Known Allergies      Assessment and Plan:  1  Coronary artery disease s/p CABG   stable  Doing well  Asymptomatic  Continue aspirin, beta-blocker, statin, Ace inhibitor    3  Essential hypertension   BP stable  Continue current medications    4  Mixed hyperlipidemia   continue statin and diet control  Her PCP closely monitor the blood work    5  LBBB (left bundle branch block)   stable  SHe denies symptoms from it    6  Diastolic dysfunction   tight BP control    7  Nonrheumatic mitral valve regurgitation, Nonrheumatic tricuspid valve regurgitation   stable  Mild  Asymptomatic  Will follow up with serial echocardiograms    9  Renal artery stenosis (HCC)   blood pressure stable  Recommend aggressive risk factor modification and therapeutic lifestyle changes  Low-salt, low-calorie, low-fat, low-cholesterol diet with regular exercise and to optimize weight  I will defer the ordering and monitoring of necessity lab studies to you, but I am available and happy to review and manage any of the data at your request in the future  Discussed concepts of atherosclerosis, including signs and symptoms of cardiac disease  Previous studies were reviewed  Safety measures were reviewed  Questions were entertained and answered  Patient was advised to report any problems requiring medical attention  Follow-up with PCP and appropriate specialist and lab work as discussed  Return for follow up visit as scheduled or earlier, if needed    Thank you for allowing me to participate in the care and evaluation of your patient  Should you have any questions, please feel free to contact me        Zenaida Escoto MD  7/30/2018,12:41 PM

## 2018-09-04 ENCOUNTER — TELEPHONE (OUTPATIENT)
Dept: GASTROENTEROLOGY | Facility: CLINIC | Age: 73
End: 2018-09-04

## 2018-12-10 ENCOUNTER — OFFICE VISIT (OUTPATIENT)
Dept: CARDIOLOGY CLINIC | Facility: CLINIC | Age: 73
End: 2018-12-10
Payer: COMMERCIAL

## 2018-12-10 VITALS
BODY MASS INDEX: 26.45 KG/M2 | HEART RATE: 88 BPM | DIASTOLIC BLOOD PRESSURE: 70 MMHG | SYSTOLIC BLOOD PRESSURE: 146 MMHG | WEIGHT: 140 LBS

## 2018-12-10 DIAGNOSIS — I34.0 NONRHEUMATIC MITRAL VALVE REGURGITATION: ICD-10-CM

## 2018-12-10 DIAGNOSIS — I51.89 DIASTOLIC DYSFUNCTION: ICD-10-CM

## 2018-12-10 DIAGNOSIS — I25.10 CORONARY ARTERY DISEASE INVOLVING NATIVE HEART WITHOUT ANGINA PECTORIS, UNSPECIFIED VESSEL OR LESION TYPE: Primary | ICD-10-CM

## 2018-12-10 DIAGNOSIS — I10 ESSENTIAL HYPERTENSION: ICD-10-CM

## 2018-12-10 DIAGNOSIS — I36.1 NONRHEUMATIC TRICUSPID VALVE REGURGITATION: ICD-10-CM

## 2018-12-10 DIAGNOSIS — E78.2 MIXED HYPERLIPIDEMIA: ICD-10-CM

## 2018-12-10 DIAGNOSIS — I44.7 LBBB (LEFT BUNDLE BRANCH BLOCK): ICD-10-CM

## 2018-12-10 DIAGNOSIS — Z95.1 HX OF CABG: ICD-10-CM

## 2018-12-10 DIAGNOSIS — I70.1 RENAL ARTERY STENOSIS (HCC): ICD-10-CM

## 2018-12-10 PROCEDURE — 99214 OFFICE O/P EST MOD 30 MIN: CPT | Performed by: INTERNAL MEDICINE

## 2018-12-10 NOTE — PROGRESS NOTES
CARDIOLOGY OFFICE VISIT  [facility]  [address], [place], [facility]  Tel: [phone]      NAME: Yu Kiran  AGE: 74 y o  SEX: female  : 1945   MRN: [id number]      Chief Complaint:  Chief Complaint   Patient presents with    Follow-up     CAD, HTN       History of Present Illness:   Patient comes for follow up  States she is doing well from cardiac stand point and denies chest pain / pressure, SOB, palpitations, lightheadedness, syncope, swelling feet, orthopnea, PND, claudication  CAD s/p CABG  -  States is doing well cardiac wise with no cardiac symptoms  Taking all medications regularly    HTN, DD -  Has been hypertensive for many years  Taking medications regularly  Denies lightheadedness, headache, medication side effects  HLP -  Has had hyperlipidemia for many years  Taking statin regularly along with diet control  Denies myalgia  PCP closely monitoring the blood work  LBBB - Patient has history of left bundle branch block, no history of bradycardia or heart block no arrhythmic symptoms syncope lightheadedness dizziness exercise intolerance     MR, TR -  Stable  Mild  Asymptomatic    Renal artery stenosis - Had ultrasound of her aorta 2016 demonstrating 50-75% stenosis of the aortic bifurcation with normal ABIs and 60% right renal stenosis   Patient denies claudication    Had May 2016 stress echo with no provocable ischemia and no significant arrhythmia    Patient has a long-standing history of anxiety and depressive tendencies--had been prescribed Paxil in the past      Past Medical History:  Past Medical History:   Diagnosis Date    Abdominal bruit     Anxiety     Aortic valve disorder     Depression     HTN (hypertension)     Hyperlipidemia     LBBB (left bundle branch block)     Mitral valve disorder     S/P CABG (coronary artery bypass graft)     Tremor     Tricuspid regurgitation          Past Surgical History:  Past Surgical History:   Procedure Laterality Date    APPENDECTOMY      CARDIAC CATHETERIZATION      CHOLECYSTECTOMY      CORONARY ARTERY BYPASS GRAFT      HYSTERECTOMY           Family History:  No family history on file  Social History:  Social History     Social History    Marital status:       Spouse name: N/A    Number of children: N/A    Years of education: N/A     Social History Main Topics    Smoking status: Never Smoker    Smokeless tobacco: Not on file    Alcohol use No    Drug use: No    Sexual activity: Not on file     Other Topics Concern    Not on file     Social History Narrative    No narrative on file         Active Problems:  Patient Active Problem List   Diagnosis    Coronary artery disease involving native heart without angina pectoris    Hx of CABG    Essential hypertension    Mixed hyperlipidemia    LBBB (left bundle branch block)    Diastolic dysfunction    Nonrheumatic mitral valve regurgitation    Nonrheumatic tricuspid valve regurgitation    Renal artery stenosis (HCC)         The following portions of the patient's history were reviewed and updated as appropriate: past medical history, past surgical history, past family history,  past social history, current medications, allergies and problem list       Review of Systems:  Constitutional: Denies fever, chills, fatigue  Eyes: Denies eye redness, eye discharge, double vision  ENT: Denies hearing loss, tinnitus, sneezing, nasal discharge, sore throat   Respiratory: Denies cough, expectoration, hemoptysis, shortness of breath  Cardiovascular: Denies chest pain, palpitations, orthopnea, PND, lower extremity swelling  Gastrointestinal: Denies abdominal pain, nausea, vomiting, hematemesis, diarrhea, bloody stools  Genito-Urinary: Denies dysuria, incontinence  Musculoskeletal: Denies back pain, joint pain, muscle pain  Neurologic: Denies confusion, lightheadedness, syncope, headache, focal weakness, sensory changes, seizures  Endocrine: Denies polyuria, polydipsia, temperature intolerance  Allergy and Immunology: Denies hives, insect bite sensitivity  Hematological and Lymphatic: Denies bleeding problems, swollen glands   Psychological: Denies depression, suicidal ideation  +anxiety  Dermatological: Denies pruritus, rash, skin lesion changes      Vitals:  Vitals:    12/10/18 0938   BP: 146/70   Pulse: 88       Body mass index is 26 45 kg/m²  Weight (last 2 days)     Date/Time   Weight    12/10/18 0938  63 5 (140)                Physical Examination:  General: Patient is not in acute distress  Awake, alert, oriented in time, place and person  Responding to commands  Head: Normocephalic  Atraumatic  Eyes: Both pupils normal sized, round and reactive to light  Nonicteric  ENT: Normal external ear canals  Nares normal, no drainage  Lips and oral mucosa normal  Neck: Supple  JVP not raised  Trachea central  No thyromegaly  Lungs: Bilateral bronchovascular breath sounds with no crackles or rhonchi  Chest wall: No tenderness  Cardiovascular: RRR  S1 and S2 normal  No murmur, rub or gallop  Gastrointestinal: Abdomen soft, nontender  No guarding or rigidity  Liver and spleen not palpable  Bowel sounds present  Neurologic: Patient is awake, alert, oriented in time, place and person  Responding to command  Moving all extremities  Integumentary:  No skin rash  Lymphatic: No cervical lymphadenopathy  Back: Symmetric   No CVA tenderness  Extremities: No clubbing, cyanosis or edema      Laboratory Results:  Cardiac testing:   Results for orders placed during the hospital encounter of 09/28/17   Echo complete with contrast if indicated    Narrative [place] [address], [facility]  [phone]    Transthoracic Echocardiogram  2D, M-mode, and Color Doppler    Study date:  28-Sep-2017    Patient: Enedelia eMdina  MR number: [id number]  Account number: [id number]  : 1945  Age: 74 years  Gender: Female  Status: Outpatient  Location: [facility]  Height: 61 in  Weight: 141 lb  BP: 138/ 70 mmHg    Indications: CAD  Diagnoses: I25 10 - Atherosclerotic heart disease of native coronary artery without angina pectoris    Sonographer:  Lawanda Bennett, RCS  Interpreting Physician:  Elizabeth Serna MD  Primary Physician:  Silvio La MD  Referring Physician:  Shanta Mcclure MD  Group:  [facility]    LEFT VENTRICLE:  Systolic function was normal  Ejection fraction was estimated in the range of 55 % to 65 %  There were no regional wall motion abnormalities  Doppler parameters were consistent with abnormal left ventricular relaxation (grade 1 diastolic dysfunction)  MITRAL VALVE:  There was mild regurgitation  AORTIC VALVE:  There was trace regurgitation  TRICUSPID VALVE:  There was mild regurgitation  PULMONIC VALVE:  There was mild regurgitation  HISTORY: PRIOR HISTORY: Left bundle branch block  Hyperlipidemia  Risk factors: hypertension, medication-treated hypercholesterolemia, and a family history of coronary artery disease  Chronic lung disease  PROCEDURE: The study was performed in the [address] [place] [address]  This was a routine study  The transthoracic approach was used  The study included complete 2D imaging, M-mode, and color Doppler  The heart rate was 93 bpm, at the  start of the study  Images were obtained from the parasternal, apical, subcostal, and suprasternal notch acoustic windows  Image quality was adequate  LEFT VENTRICLE: Size was normal  Systolic function was normal  Ejection fraction was estimated in the range of 55 % to 65 %  There were no regional wall motion abnormalities  Wall thickness was normal  DOPPLER: Doppler parameters were  consistent with abnormal left ventricular relaxation (grade 1 diastolic dysfunction)      RIGHT VENTRICLE: The size was normal  Systolic function was normal  Wall thickness was normal     LEFT ATRIUM: Size was normal     RIGHT ATRIUM: Size was normal     MITRAL VALVE: Valve structure was normal  There was normal leaflet separation  DOPPLER: The transmitral velocity was within the normal range  There was no evidence for stenosis  There was mild regurgitation  AORTIC VALVE: The valve was trileaflet  Leaflets exhibited normal thickness and normal cuspal separation  DOPPLER: Transaortic velocity was within the normal range  There was no evidence for stenosis  There was trace regurgitation  TRICUSPID VALVE: The valve structure was normal  There was normal leaflet separation  DOPPLER: The transtricuspid velocity was within the normal range  There was no evidence for stenosis  There was mild regurgitation  PULMONIC VALVE: Leaflets exhibited normal thickness, no calcification, and normal cuspal separation  DOPPLER: The transpulmonic velocity was within the normal range  There was mild regurgitation  PERICARDIUM: There was no pericardial effusion  The pericardium was normal in appearance  AORTA: The root exhibited normal size  SYSTEMIC VEINS: IVC: The inferior vena cava was normal in size and course  Respirophasic changes were normal     SYSTEM MEASUREMENT TABLES    Apical four chamber  4 chamber Left Atrium Volume Index; Planimetry; End Systole; Apical four chamber;: 12 71 cm2  Left Ventricular Diastolic Area; Method of Disks, Single Plane; End Diastole; Apical four chamber;: 24 72 cm2  Left Ventricular Ejection Fraction; Method of Disks, Single Plane; Apical four chamber;: 62 2 %  Left Ventricular systolic Area; Method of Disks, Single Plane; End Systole; Apical four chamber;: 12 99 cm2  Right Atrium Systolic Area; Planimetry; End Systole; Apical four chamber;: 12 63 cm2  Right Ventricular Internal Diastolic Dimension; End Diastole;  Apical four chamber;: 26 mm  TAPSE: 15 1 mm    Unspecified Scan Mode  Aortic Root Diameter; End Systole;: 30 5 mm  Aortic valve Area; Continuity Equation by Velocity Time Integral; Systole;: 2 65 cm2  Cardiovascular Orifice Diameter; End Systole;: 19 7 mm  Gradient Pressure, Peak; Simplified Bernoulli; Antegrade Flow; Systole;: 8 5 mm[Hg]  Gradient pressure, average; Simplified Bernoulli; Antegrade Flow; Systole;: 4 7 mm[Hg]  Left Atrium to Aortic Root Ratio;: 1 2  Left atrial diameter; End Diastole;: 36 5 mm  Cardiac Output; Teichholz; Systole;: 2 21 L/min  Heart rate; Teichholz;: 86 /min  Interventricular Septum Diastolic Thickness; Teichholz; End Diastole;: 8 9 mm  Left Ventricle Internal End Diastolic Dimension; Teichholz;: 32 5 mm  Left Ventricle Internal Systolic Dimension; Teichholz; End Systole;: 22 3 mm  Left Ventricle Mass; Mass AVCube with Teichholz; End Diastole;: 84 g  Left Ventricle Posterior Wall Diastolic Thickness; Teichholz; End Diastole;: 9 8 mm  Left Ventricular Ejection Fraction; Teichholz;: 60 5 %  Left Ventricular End Diastolic Volume; Teichholz;: 42 5 ml  Left Ventricular End Systolic Volume; Teichholz;: 16 8 ml  Left Ventricular Fractional Shortening;: 31 4 %  Stroke volume;  Teichholz; Systole;: 25 7 ml  Mitral Valve Area; Area by Pressure Half-Time; Systole;: 5 cm2  Mitral Valve E to A Ratio; Systole;: 0 67  Pressure half time; Diastole;: 0 04 s  Maximum Tricuspid valve regurgitation pressure gradient; Regurgitant Flow; Systole;: 23 2 mm[Hg]    [address] Accredited [facility]    Prepared and electronically signed by    Yfn Smith MD  Signed 28-Sep-2017 17:54:05         Medications:    Current Outpatient Prescriptions:     aspirin (ASPIR-81) 81 mg EC tablet, Take 1 tablet by mouth daily, Disp: , Rfl:     atenolol (TENORMIN) 50 mg tablet, Take 50 mg by mouth, Disp: , Rfl:     cholecalciferol (VITAMIN D3) 1,000 units tablet, Take 1,000 Units by mouth daily, Disp: , Rfl:     lisinopril-hydrochlorothiazide ([place]) 20-25 MG per tablet, Take 1 tablet by mouth, Disp: , Rfl:     LORazepam (ATIVAN) 1 mg tablet, Take 1 mg by mouth, Disp: , Rfl:     Omega 3 1200 MG CAPS, Take by mouth, Disp: , Rfl:     simvastatin (ZOCOR) 40 mg tablet, Take 1 tablet by mouth daily, Disp: , Rfl:     famotidine (PEPCID) 20 mg tablet, Take 1 tablet (20 mg total) by mouth 2 (two) times a day (Patient not taking: Reported on 12/10/2018 ), Disp: 90 tablet, Rfl: 3      Allergies:  No Known Allergies      Assessment and Plan:  1  Coronary artery disease s/p CABG   stable  Doing well  Asymptomatic  Continue aspirin, beta-blocker, statin, Ace inhibitor    3  Essential hypertension   BP on higher side  Patient asked to get a home monitor, take some readings and call me with the numbers  Will adjust medications then if needed  Continue current medications for now    4  Mixed hyperlipidemia   continue statin and diet control  Her PCP closely monitor the blood work    5  LBBB (left bundle branch block)   stable  SHe denies symptoms from it    6  Diastolic dysfunction   tight BP control    7  Nonrheumatic mitral valve regurgitation, Nonrheumatic tricuspid valve regurgitation   stable  Mild  Asymptomatic  Will follow up with serial echocardiograms    9  Renal artery stenosis (HCC)   blood pressure stable  Recommend aggressive risk factor modification and therapeutic lifestyle changes  Low-salt, low-calorie, low-fat, low-cholesterol diet with regular exercise and to optimize weight  I will defer the ordering and monitoring of necessity lab studies to you, but I am available and happy to review and manage any of the data at your request in the future  Discussed concepts of atherosclerosis, including signs and symptoms of cardiac disease  Previous studies were reviewed  Safety measures were reviewed  Questions were entertained and answered  Patient was advised to report any problems requiring medical attention      Follow-up with PCP and appropriate specialist and lab work as discussed  Return for follow up visit as scheduled or earlier, if needed  Thank you for allowing me to participate in the care and evaluation of your patient  Should you have any questions, please feel free to contact me        Desmond Lagunas MD  12/10/2018,10:27 AM

## 2018-12-17 ENCOUNTER — TELEPHONE (OUTPATIENT)
Dept: CARDIOLOGY CLINIC | Facility: CLINIC | Age: 73
End: 2018-12-17

## 2018-12-17 NOTE — TELEPHONE ENCOUNTER
PT CALLED IN BP READINGS:  12/16 -  BP - 154/75 HR 61 IN THE AM               BP - 143/77 HR 60 IN THE AM (5 MINS LATER)               BP - 120/62 HR 62 IN THE AM (5 MINS LATER)  12/17 - BP - 155/79 HR 61 IN THE AM                 BP - 153/77 HR 60 IN THE AM (3 MINS LATER)                BP - 150/76 HR 65 IN THE AM (3 MINS LATER)    PT CANNOT DO THE ARM CUFF  PT USED THE WRIST CUFF AND SOMETIMES THE LIGHT WAS BLUE AND OTHER TIMES IT WAS YELLOW AND PT HAD TO CALL FOR HELP ON THE PHONE SO IT WOULD ADJUST CORRECTLY  PT HAS LYME DISEASE AND IS TAKING AN ANTIBIOTIC WHICH SHE FEELS REALLY SICK FROM, PT DOESN'T REMEMBER IF SHE GAVE DR MONACO'S NURSE INFO ON HER CHEST XRAY AND BW  PT WOULD ALSO LIKE A SUMMARY OF HER LAST VISIT MAILED TO HER   PLEASE CALL PT

## 2019-05-01 ENCOUNTER — OFFICE VISIT (OUTPATIENT)
Dept: CARDIOLOGY CLINIC | Facility: CLINIC | Age: 74
End: 2019-05-01
Payer: COMMERCIAL

## 2019-05-01 VITALS
BODY MASS INDEX: 26.81 KG/M2 | HEIGHT: 61 IN | OXYGEN SATURATION: 95 % | DIASTOLIC BLOOD PRESSURE: 70 MMHG | WEIGHT: 142 LBS | HEART RATE: 61 BPM | SYSTOLIC BLOOD PRESSURE: 120 MMHG

## 2019-05-01 DIAGNOSIS — I10 ESSENTIAL HYPERTENSION: ICD-10-CM

## 2019-05-01 DIAGNOSIS — I51.89 DIASTOLIC DYSFUNCTION: ICD-10-CM

## 2019-05-01 DIAGNOSIS — I44.7 LBBB (LEFT BUNDLE BRANCH BLOCK): ICD-10-CM

## 2019-05-01 DIAGNOSIS — I25.10 CORONARY ARTERY DISEASE INVOLVING NATIVE HEART WITHOUT ANGINA PECTORIS, UNSPECIFIED VESSEL OR LESION TYPE: Primary | ICD-10-CM

## 2019-05-01 DIAGNOSIS — I36.1 NONRHEUMATIC TRICUSPID VALVE REGURGITATION: ICD-10-CM

## 2019-05-01 DIAGNOSIS — E78.2 MIXED HYPERLIPIDEMIA: ICD-10-CM

## 2019-05-01 DIAGNOSIS — I34.0 NONRHEUMATIC MITRAL VALVE REGURGITATION: ICD-10-CM

## 2019-05-01 DIAGNOSIS — Z95.1 HX OF CABG: ICD-10-CM

## 2019-05-01 DIAGNOSIS — I70.1 RENAL ARTERY STENOSIS (HCC): ICD-10-CM

## 2019-05-01 PROCEDURE — 99214 OFFICE O/P EST MOD 30 MIN: CPT | Performed by: INTERNAL MEDICINE

## 2019-05-06 ENCOUNTER — TELEPHONE (OUTPATIENT)
Dept: CARDIOLOGY CLINIC | Facility: CLINIC | Age: 74
End: 2019-05-06

## 2019-12-20 ENCOUNTER — OFFICE VISIT (OUTPATIENT)
Dept: CARDIOLOGY CLINIC | Facility: CLINIC | Age: 74
End: 2019-12-20
Payer: COMMERCIAL

## 2019-12-20 VITALS
OXYGEN SATURATION: 97 % | DIASTOLIC BLOOD PRESSURE: 66 MMHG | SYSTOLIC BLOOD PRESSURE: 110 MMHG | RESPIRATION RATE: 18 BRPM | BODY MASS INDEX: 26.24 KG/M2 | HEART RATE: 67 BPM | HEIGHT: 61 IN | WEIGHT: 139 LBS

## 2019-12-20 DIAGNOSIS — I70.1 RENAL ARTERY STENOSIS (HCC): ICD-10-CM

## 2019-12-20 DIAGNOSIS — I44.7 LBBB (LEFT BUNDLE BRANCH BLOCK): ICD-10-CM

## 2019-12-20 DIAGNOSIS — I36.1 NONRHEUMATIC TRICUSPID VALVE REGURGITATION: ICD-10-CM

## 2019-12-20 DIAGNOSIS — I34.0 NONRHEUMATIC MITRAL VALVE REGURGITATION: ICD-10-CM

## 2019-12-20 DIAGNOSIS — E78.2 MIXED HYPERLIPIDEMIA: ICD-10-CM

## 2019-12-20 DIAGNOSIS — I10 ESSENTIAL HYPERTENSION: ICD-10-CM

## 2019-12-20 DIAGNOSIS — I51.89 DIASTOLIC DYSFUNCTION: ICD-10-CM

## 2019-12-20 DIAGNOSIS — Z95.1 HX OF CABG: ICD-10-CM

## 2019-12-20 DIAGNOSIS — I25.10 CORONARY ARTERY DISEASE INVOLVING NATIVE HEART WITHOUT ANGINA PECTORIS, UNSPECIFIED VESSEL OR LESION TYPE: Primary | ICD-10-CM

## 2019-12-20 PROCEDURE — 99214 OFFICE O/P EST MOD 30 MIN: CPT | Performed by: INTERNAL MEDICINE

## 2019-12-20 RX ORDER — PAROXETINE 10 MG/1
10 TABLET, FILM COATED ORAL DAILY
COMMUNITY
Start: 2019-08-01 | End: 2020-08-31

## 2019-12-20 NOTE — PROGRESS NOTES
CARDIOLOGY OFFICE VISIT  [facility]  [facility] [address], [facility] [place] Earl  Tel: [phone]      NAME: George Barrett  AGE: 66 y o  SEX: female  : 1945   MRN: [id number]      Chief Complaint:  Chief Complaint   Patient presents with    Follow-up         History of Present Illness:   Atul Pearson comes for follow-up  States she is doing well  Zafar denies chest pain, shortness of breath, palpitations, lightheadedness, syncope, swelling feet, orthopnea, PND, claudication  CAD s/p CABG  -  States is doing well cardiac wise with no cardiac symptoms  Taking all medications regularly  HTN, DD -  Has been hypertensive for many years  Taking medications regularly  Denies lightheadedness, headache, medication side effects  HLP -  Has had hyperlipidemia for many years  Taking statin regularly along with diet control  Denies myalgia  PCP closely monitoring the blood work  LBBB - Patient has history of left bundle branch block, no history of bradycardia or heart block no arrhythmic symptoms syncope lightheadedness dizziness exercise intolerance      MR, TR -  Stable  Mild  Asymptomatic     Renal artery stenosis - Had ultrasound of her aorta 2016 demonstrating 50-75% stenosis of the aortic bifurcation with normal ABIs and 60% right renal stenosis   Patient denies claudication     Had May 2016 stress echo with no provocable ischemia and no significant arrhythmia     Patient has a long-standing history of anxiety and depressive tendencies--had been prescribed Paxil in the past     Past Medical History:  Past Medical History:   Diagnosis Date    Abdominal bruit     Anxiety     Aortic valve disorder     Depression     HTN (hypertension)     Hyperlipidemia     LBBB (left bundle branch block)     Mitral valve disorder     S/P CABG (coronary artery bypass graft)     Tremor     Tricuspid regurgitation Past Surgical History:  Past Surgical History:   Procedure Laterality Date    APPENDECTOMY      CARDIAC CATHETERIZATION      CHOLECYSTECTOMY      CORONARY ARTERY BYPASS GRAFT      HYSTERECTOMY         Family History:   noncontributory    Social History:  Social History     Socioeconomic History    Marital status:       Spouse name: None    Number of children: None    Years of education: None    Highest education level: None   Occupational History    None   Social Needs    Financial resource strain: None    Food insecurity:     Worry: None     Inability: None    Transportation needs:     Medical: None     Non-medical: None   Tobacco Use    Smoking status: Never Smoker   Substance and Sexual Activity    Alcohol use: No    Drug use: No    Sexual activity: None   Lifestyle    Physical activity:     Days per week: None     Minutes per session: None    Stress: None   Relationships    Social connections:     Talks on phone: None     Gets together: None     Attends [employer] service: None     Active member of club or organization: None     Attends meetings of clubs or organizations: None     Relationship status: None    Intimate partner violence:     Fear of current or ex partner: None     Emotionally abused: None     Physically abused: None     Forced sexual activity: None   Other Topics Concern    None   Social History Narrative    None         Active Problems:  Patient Active Problem List   Diagnosis    Coronary artery disease involving native heart without angina pectoris    Hx of CABG    Essential hypertension    Mixed hyperlipidemia    LBBB (left bundle branch block)    Diastolic dysfunction    Nonrheumatic mitral valve regurgitation    Nonrheumatic tricuspid valve regurgitation    Renal artery stenosis (HCC)         The following portions of the patient's history were reviewed and updated as appropriate: past medical history, past surgical history, past family history, past social history, current medications, allergies and problem list       Review of Systems:  Constitutional: Denies fever, chills  Eyes: Denies eye redness, eye discharge, double vision  ENT: Denies hearing loss, tinnitus, sneezing, nasal discharge, sore throat   Respiratory: Denies cough, expectoration, hemoptysis, shortness of breath  Cardiovascular: Denies chest pain, orthopnea, PND, lower extremity swelling  Gastrointestinal: Denies abdominal pain, nausea, vomiting, hematemesis, diarrhea, bloody stools  Genito-Urinary: Denies dysuria, incontinence  Musculoskeletal: Denies back pain  Neurologic: Denies confusion, lightheadedness, syncope, headache, seizures  Endocrine: Denies polyuria, polydipsia, temperature intolerance  Allergy and Immunology: Denies hives, insect bite sensitivity  Hematological and Lymphatic: Denies bleeding problems, swollen glands   Psychological: Denies depression, suicidal ideation, anxiety, panic  Dermatological: Denies pruritus, rash, skin lesion changes      Vitals:  Vitals:    12/20/19 1000   BP: 110/66   Pulse: 67   Resp: 18   SpO2: 97%       Body mass index is 26 26 kg/m²  Weight (last 2 days)     Date/Time   Weight    12/20/19 1000   63 (139)              Physical Examination:  General: Patient is not in acute distress  Awake, alert  Responding to commands  Head: Normocephalic  Atraumatic  Eyes: Nonicteric  ENT: Normal external ear canals  Nares normal, no drainage  Lips and oral mucosa normal  Neck: Supple  JVP not raised  Trachea central  No thyromegaly  Lungs: Bilateral bronchovascular breath sounds with no crackles or rhonchi  Chest wall: No tenderness  Cardiovascular: RRR  S1 and S2 normal  No murmur, rub or gallop  Gastrointestinal: Abdomen soft, nontender  No guarding or rigidity  Bowel sounds present  Neurologic: Patient is awake, alert, oriented in time, place and person  Responding to command   Moving all extremities  Integumentary:  No skin rash  Lymphatic: No cervical lymphadenopathy  Back: Symmetric  No CVA tenderness  Extremities: No clubbing, cyanosis or edema      Laboratory Results:  Cardiac testing:   Results for orders placed during the hospital encounter of 17   Echo complete with contrast if indicated    Narrative [address], [address] [facility]  [phone]    Transthoracic Echocardiogram  2D, M-mode, and Color Doppler    Study date:  28-Sep-2017    Patient: Bernie Lewis  MR number: [id number]  Account number: [de-identified]  : 1945  Age: 74 years  Gender: Female  Status: Outpatient  Location: [facility]  Height: 61 in  Weight: 141 lb  BP: 138/ 70 mmHg    Indications: CAD  Diagnoses: I25 10 - Atherosclerotic heart disease of native coronary artery without angina pectoris    Sonographer:  Grande RCS  Interpreting Physician:  Dav Barrow MD  Primary Physician:  Aranza Jimenes MD  Referring Physician:  yT Sloan MD  Group:  [facility]    LEFT VENTRICLE:  Systolic function was normal  Ejection fraction was estimated in the range of 55 % to 65 %  There were no regional wall motion abnormalities  Doppler parameters were consistent with abnormal left ventricular relaxation (grade 1 diastolic dysfunction)  MITRAL VALVE:  There was mild regurgitation  AORTIC VALVE:  There was trace regurgitation  TRICUSPID VALVE:  There was mild regurgitation  PULMONIC VALVE:  There was mild regurgitation  HISTORY: PRIOR HISTORY: Left bundle branch block  Hyperlipidemia  Risk factors: hypertension, medication-treated hypercholesterolemia, and a family history of coronary artery disease  Chronic lung disease  PROCEDURE: The study was performed in the [address] [place] [address]  This was a routine study  The transthoracic approach was used  The study included complete 2D imaging, M-mode, and color Doppler   The heart rate was 93 bpm, at the  start of the study  Images were obtained from the parasternal, apical, subcostal, and suprasternal notch acoustic windows  Image quality was adequate  LEFT VENTRICLE: Size was normal  Systolic function was normal  Ejection fraction was estimated in the range of 55 % to 65 %  There were no regional wall motion abnormalities  Wall thickness was normal  DOPPLER: Doppler parameters were  consistent with abnormal left ventricular relaxation (grade 1 diastolic dysfunction)  RIGHT VENTRICLE: The size was normal  Systolic function was normal  Wall thickness was normal     LEFT ATRIUM: Size was normal     RIGHT ATRIUM: Size was normal     MITRAL VALVE: Valve structure was normal  There was normal leaflet separation  DOPPLER: The transmitral velocity was within the normal range  There was no evidence for stenosis  There was mild regurgitation  AORTIC VALVE: The valve was trileaflet  Leaflets exhibited normal thickness and normal cuspal separation  DOPPLER: Transaortic velocity was within the normal range  There was no evidence for stenosis  There was trace regurgitation  TRICUSPID VALVE: The valve structure was normal  There was normal leaflet separation  DOPPLER: The transtricuspid velocity was within the normal range  There was no evidence for stenosis  There was mild regurgitation  PULMONIC VALVE: Leaflets exhibited normal thickness, no calcification, and normal cuspal separation  DOPPLER: The transpulmonic velocity was within the normal range  There was mild regurgitation  PERICARDIUM: There was no pericardial effusion  The pericardium was normal in appearance  AORTA: The root exhibited normal size  SYSTEMIC VEINS: IVC: The inferior vena cava was normal in size and course  Respirophasic changes were normal     SYSTEM MEASUREMENT TABLES    Apical four chamber  4 chamber Left Atrium Volume Index; Planimetry; End Systole;  Apical four chamber;: 12 71 cm2  Left Ventricular Diastolic Area; Method of Disks, Single Plane; End Diastole; Apical four chamber;: 24 72 cm2  Left Ventricular Ejection Fraction; Method of Disks, Single Plane; Apical four chamber;: 62 2 %  Left Ventricular systolic Area; Method of Disks, Single Plane; End Systole; Apical four chamber;: 12 99 cm2  Right Atrium Systolic Area; Planimetry; End Systole; Apical four chamber;: 12 63 cm2  Right Ventricular Internal Diastolic Dimension; End Diastole; Apical four chamber;: 26 mm  TAPSE: 15 1 mm    Unspecified Scan Mode  Aortic Root Diameter; End Systole;: 30 5 mm  Aortic valve Area; Continuity Equation by Velocity Time Integral; Systole;: 2 65 cm2  Cardiovascular Orifice Diameter; End Systole;: 19 7 mm  Gradient Pressure, Peak; Simplified Bernoulli; Antegrade Flow; Systole;: 8 5 mm[Hg]  Gradient pressure, average; Simplified Bernoulli; Antegrade Flow; Systole;: 4 7 mm[Hg]  Left Atrium to Aortic Root Ratio;: 1 2  Left atrial diameter; End Diastole;: 36 5 mm  Cardiac Output; Teichholz; Systole;: 2 21 L/min  Heart rate; Teichholz;: 86 /min  Interventricular Septum Diastolic Thickness; Teichholz; End Diastole;: 8 9 mm  Left Ventricle Internal End Diastolic Dimension; Teichholz;: 32 5 mm  Left Ventricle Internal Systolic Dimension; Teichholz; End Systole;: 22 3 mm  Left Ventricle Mass; Mass AVCube with Teichholz; End Diastole;: 84 g  Left Ventricle Posterior Wall Diastolic Thickness; Teichholz; End Diastole;: 9 8 mm  Left Ventricular Ejection Fraction; Teichholz;: 60 5 %  Left Ventricular End Diastolic Volume; Teichholz;: 42 5 ml  Left Ventricular End Systolic Volume; Teichholz;: 16 8 ml  Left Ventricular Fractional Shortening;: 31 4 %  Stroke volume;  Teichholz; Systole;: 25 7 ml  Mitral Valve Area; Area by Pressure Half-Time; Systole;: 5 cm2  Mitral Valve E to A Ratio; Systole;: 0 67  Pressure half time; Diastole;: 0 04 s  Maximum Tricuspid valve regurgitation pressure gradient; Regurgitant Flow; Systole;: 23 2 mm[Hg]    [employer] Commission Accredited Echocardiography Laboratory    Prepared and electronically signed by    Tamika Krishnamurthy MD  Signed 28-Sep-2017 17:54:05         Medications:    Current Outpatient Medications:     aspirin (ASPIR-81) 81 mg EC tablet, Take 1 tablet by mouth daily, Disp: , Rfl:     atenolol (TENORMIN) 50 mg tablet, Take 50 mg by mouth, Disp: , Rfl:     cholecalciferol (VITAMIN D3) 1,000 units tablet, Take 1,000 Units by mouth daily, Disp: , Rfl:     lisinopril-hydrochlorothiazide (PRINZIDE,ZESTORETIC) 20-25 MG per tablet, Take 1 tablet by mouth, Disp: , Rfl:     LORazepam (ATIVAN) 1 mg tablet, Take 1 mg by mouth, Disp: , Rfl:     Omega 3 1200 MG CAPS, Take by mouth, Disp: , Rfl:     PARoxetine (PAXIL) 10 mg tablet, Take 10 mg by mouth daily, Disp: , Rfl:     simvastatin (ZOCOR) 40 mg tablet, Take 1 tablet by mouth daily, Disp: , Rfl:       Allergies: Allergies   Allergen Reactions    Primidone      Other reaction(s): (PIMS) depression         Assessment and Plan:  1  Coronary artery disease s/p CABG  stable  Doing well  Asymptomatic  Continue aspirin, beta-blocker, Ace inhibitor, statin    2  Mitral regurgitation, tricuspid regurgitation  stable  Asymptomatic  Mild  Follow-up with serial echocardiograms    3  Essential hypertension, Diastolic dysfunction   BP stable  Continue current medications    Patient has a mild cough for a while now  I wanted to change her lisinopril to losartan but she states she has a postnasal drip and this is her allergy season  So she wants to see an ENT before and only if the cough does not resolve after that, she will change lisinopril to losartan    4  Renal artery stenosis  blood pressure stable    5  Mixed hyperlipidemia   continue statin and diet control  Her PCP closely monitor the blood work    6  LBBB (left bundle branch block)   stable  She denies symptoms from it    Recommend aggressive risk factor modification and therapeutic lifestyle changes  Low-salt, low-calorie, low-fat, low-cholesterol diet with regular exercise and to optimize weight  I will defer the ordering and monitoring of necessity lab studies to you, but I am available and happy to review and manage any of the data at your request in the future  Discussed concepts of atherosclerosis, including signs and symptoms of cardiac disease  Previous studies were reviewed  Safety measures were reviewed  Questions were entertained and answered  Patient was advised to report any problems requiring medical attention  Follow-up with PCP and appropriate specialist and lab work as discussed  Return for follow up visit as scheduled or earlier, if needed  Thank you for allowing me to participate in the care and evaluation of your patient  Should you have any questions, please feel free to contact me        Morenita De La Rosa MD  12/20/2019,10:43 AM

## 2020-03-16 ENCOUNTER — APPOINTMENT (EMERGENCY)
Dept: CT IMAGING | Facility: HOSPITAL | Age: 75
End: 2020-03-16
Payer: COMMERCIAL

## 2020-03-16 ENCOUNTER — HOSPITAL ENCOUNTER (EMERGENCY)
Facility: HOSPITAL | Age: 75
Discharge: HOME | End: 2020-03-16
Attending: EMERGENCY MEDICINE | Admitting: EMERGENCY MEDICINE
Payer: COMMERCIAL

## 2020-03-16 VITALS
TEMPERATURE: 97.8 F | WEIGHT: 140 LBS | HEIGHT: 60 IN | OXYGEN SATURATION: 99 % | DIASTOLIC BLOOD PRESSURE: 86 MMHG | RESPIRATION RATE: 17 BRPM | BODY MASS INDEX: 27.48 KG/M2 | SYSTOLIC BLOOD PRESSURE: 146 MMHG | HEART RATE: 68 BPM

## 2020-03-16 DIAGNOSIS — S09.90XA MINOR HEAD INJURY WITHOUT LOSS OF CONSCIOUSNESS: ICD-10-CM
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
## 2020-03-16 PROCEDURE — 70450 CT HEAD/BRAIN W/O DYE: CPT

## 2020-03-16 PROCEDURE — 99284 EMERGENCY DEPT VISIT MOD MDM: CPT

## 2020-03-16 PROCEDURE — 99284 EMERGENCY DEPT VISIT MOD MDM: CPT | Performed by: EMERGENCY MEDICINE

## 2020-03-16 PROCEDURE — 72125 CT NECK SPINE W/O DYE: CPT

## 2020-03-16 RX ORDER — ACETAMINOPHEN 325 MG/1
650 TABLET ORAL ONCE
Status: COMPLETED | OUTPATIENT
Start: 2020-03-16 | End: 2020-03-16

## 2020-03-16 RX ADMIN — ACETAMINOPHEN 650 MG: 325 TABLET, FILM COATED ORAL at 13:01

## 2020-03-16 NOTE — DISCHARGE INSTRUCTIONS
Head Injury   WHAT YOU NEED TO KNOW:   A head injury is most often caused by a blow to the head  This may occur from a fall, bicycle injury, sports injury, being struck in the head, or a motor vehicle accident  DISCHARGE INSTRUCTIONS:   Call 911 or have someone else call for any of the following:   · You cannot be woken  · You have a seizure  · You stop responding to others or you faint  · You have blurry or double vision  · Your speech becomes slurred or confused  · You have arm or leg weakness, loss of feeling, or new problems with coordination  · Your pupils are larger than usual or one pupil is a different size than the other  · You have blood or clear fluid coming out of your ears or nose  Seek care immediately if:   · You have repeated or forceful vomiting  · You feel confused  · Your headache gets worse or becomes severe  · You or someone caring for you notices that you are harder to wake than usual   Contact your healthcare provider if:   · Your symptoms last longer than 6 weeks after the injury  · You have questions or concerns about your condition or care  Medicines:   · Acetaminophen  decreases pain  Acetaminophen is available without a doctor's order  Ask how much to take and how often to take it  Follow directions  Acetaminophen can cause liver damage if not taken correctly  · Take your medicine as directed  Contact your healthcare provider if you think your medicine is not helping or if you have side effects  Tell him or her if you are allergic to any medicine  Keep a list of the medicines, vitamins, and herbs you take  Include the amounts, and when and why you take them  Bring the list or the pill bottles to follow-up visits  Carry your medicine list with you in case of an emergency  Self-care:   · Rest  or do quiet activities for 24 to 48 hours  Limit your time watching TV, using the computer, or doing tasks that require a lot of thinking   Slowly return to your normal activities as directed  Do not play sports or do activities that may cause you to get hit in the head  Ask your healthcare provider when you can return to sports  · Apply ice  on your head for 15 to 20 minutes every hour or as directed  Use an ice pack, or put crushed ice in a plastic bag  Cover it with a towel before you apply it to your skin  Ice helps prevent tissue damage and decreases swelling and pain  · Have someone stay with you for 24 hours  or as directed  This person can monitor you for complications and call [phone]  When you are awake the person should ask you a few questions to see if you are thinking clearly  An example would be to ask your name or your address  Prevent another head injury:   · Wear a helmet that fits properly  Do this when you play sports, or ride a bike, scooter, or skateboard  Helmets help decrease your risk of a serious head injury  Talk to your healthcare provider about other ways you can protect yourself if you play sports  · Wear your seat belt every time you are in a car  This helps to decrease your risk for a head injury if you are in a car accident  Follow up with your healthcare provider as directed:  Write down your questions so you remember to ask them during your visits  © 2017 [address]  Information is for End User's use only and may not be sold, redistributed or otherwise used for commercial purposes  All illustrations and images included in CareNotes® are the copyrighted property of [employer]  or George Morrow  The above information is an  only  It is not intended as medical advice for individual conditions or treatments  Talk to your doctor, nurse or pharmacist before following any medical regimen to see if it is safe and effective for you  Cervical Strain   WHAT YOU NEED TO KNOW:   A cervical strain is a stretched or torn muscle or tendon in your neck   Tendons are strong tissues that connect muscles to bones  Common causes of cervical strains include a car accident, a fall, or a sports injury  DISCHARGE INSTRUCTIONS:   Seek care immediately if:   · You have pain or numbness from your shoulder down to your hand  · You have problems with your vision, hearing, or balance  · You feel confused or cannot concentrate  · You have problems with movement and strength  Contact your healthcare provider if:   · You have increased swelling or pain in your neck  · You have questions or concerns about your condition or care  Medicines: You may need any of the following:  · Acetaminophen  decreases pain and fever  It is available without a doctor's order  Ask how much to take and how often to take it  Follow directions  Read the labels of all other medicines you are using to see if they also contain acetaminophen, or ask your doctor or pharmacist  Acetaminophen can cause liver damage if not taken correctly  Do not use more than 4 grams (4,000 milligrams) total of acetaminophen in one day  · NSAIDs , such as ibuprofen, help decrease swelling, pain, and fever  This medicine is available with or without a doctor's order  NSAIDs can cause stomach bleeding or kidney problems in certain people  If you take blood thinner medicine, always ask your healthcare provider if NSAIDs are safe for you  Always read the medicine label and follow directions  · Muscle relaxers  help decrease pain and muscle spasms  · Prescription pain medicine  may be given  Ask your healthcare provider how to take this medicine safely  Some prescription pain medicines contain acetaminophen  Do not take other medicines that contain acetaminophen without talking to your healthcare provider  Too much acetaminophen may cause liver damage  Prescription pain medicine may cause constipation  Ask your healthcare provider how to prevent or treat constipation  · Take your medicine as directed    Contact your healthcare provider if you think your medicine is not helping or if you have side effects  Tell him or her if you are allergic to any medicine  Keep a list of the medicines, vitamins, and herbs you take  Include the amounts, and when and why you take them  Bring the list or the pill bottles to follow-up visits  Carry your medicine list with you in case of an emergency  Manage your symptoms:   · Apply heat  on your neck for 15 to 20 minutes, 4 to 6 times a day or as directed  Heat helps decrease pain, stiffness, and muscle spasms  · Begin gentle neck exercises  as soon as you can move your neck without pain  Exercises will help decrease stiffness and improve the strength and movement of your neck  Ask your healthcare provider what kind of exercises you should do  · Gradually return to your usual activities as directed  Stop if you have pain  Avoid activities that can cause more damage to your neck, such as heavy lifting or strenuous exercise  · Sleep without a pillow  to help decrease pain  Instead, roll a small towel tightly and place it under your neck  · Go to physical therapy as directed  A physical therapist teaches you exercises to help improve movement and strength, and to decrease pain  Prevent neck injury:   · Drive safely  Make sure everyone in your car wears a seatbelt  A seatbelt can save your life if you are in an accident  Do not use your cell phone when you are driving  This could distract you and cause an accident  Pull over if you need to make a call or send a text message  · Wear helmets, lifejackets, and protective gear  Always wear a helmet when you ride a bike or motorcycle, go skiing, or play sports that could cause a head injury  Wear protective equipment when you play sports  Wear a lifejacket when you are on a boat or doing water sports  Follow up with your healthcare provider as directed: You may be referred to an orthopedist or physical therapies   Write down your questions so you remember to ask them during your visits  © 2017 [address] [employer] Information is for End User's use only and may not be sold, redistributed or otherwise used for commercial purposes  All illustrations and images included in CareNotes® are the copyrighted property of A [employer] M , Inc  or George Morrow  The above information is an  only  It is not intended as medical advice for individual conditions or treatments  Talk to your doctor, nurse or pharmacist before following any medical regimen to see if it is safe and effective for you

## 2020-03-16 NOTE — ED PROVIDER NOTES
History  Chief Complaint   Patient presents with    Fall     Patient states"she fell outside and hit her head on a rock around noon today"  Patient denies loc  Patient c/o posterior head pain  Patient taking aspirin daily  Patient is a 58-year-old female with past medical history of hypertension, hyperlipidemia, coronary artery disease status post CABG, valvular disease, depression, anxiety, presents to the emergency department after she fell and struck the back of her head this afternoon at around 12:00 p m  Breonna Sr Patient reports she was carrying 2 heavy bags in both of her arms and the bags were different weights so it was throwing off her balance which caused her to fall  She struck her head against a rock wall but denies loss of consciousness  She noticed bump to the left parietal and occipital scalp  Denies any bleeding or open wounds  She denies feeling significant headache currently but does complain of mild posterior neck pain  On review of systems, she does report a few episodes of diarrhea this morning prior to her fall and attributes that to drinking coffee which she has not drank in many months  She denies any change in her vision, change in hearing, tinnitus, recent cough or URI symptoms, recent fever or chills, dizziness or near syncope, chest pain, palpitations, dyspnea, abdominal pain, nausea, vomiting, blood per rectum, melena, urinary symptoms, flank pain, skin rash or color change, back pain, lateralizing extremity weakness or paresthesia, facial asymmetry, slurring of speech or difficulty getting words out or other new focal neurologic deficits  She is on 81 mg of aspirin daily and denies being on any other blood thinners        History provided by:  Patient   used: No    Fall   Associated symptoms: neck pain    Associated symptoms: no abdominal pain, no back pain, no chest pain, no headaches, no nausea and no vomiting        Prior to Admission Medications Prescriptions Last Dose Informant Patient Reported? Taking? LORazepam (ATIVAN) 1 mg tablet  Self Yes No   Sig: Take 1 mg by mouth   Omega 3 1200 MG CAPS  Self Yes No   Sig: Take by mouth   PARoxetine (PAXIL) 10 mg tablet   Yes No   Sig: Take 10 mg by mouth daily   aspirin (ASPIR-81) 81 mg EC tablet  Self Yes No   Sig: Take 1 tablet by mouth daily   atenolol (TENORMIN) 50 mg tablet  Self Yes No   Sig: Take 50 mg by mouth   cholecalciferol (VITAMIN D3) 1,000 units tablet  Self Yes No   Sig: Take 1,000 Units by mouth daily   lisinopril-hydrochlorothiazide (PRINZIDE,ZESTORETIC) 20-25 MG per tablet  Self Yes No   Sig: Take 1 tablet by mouth   simvastatin (ZOCOR) 40 mg tablet  Self Yes No   Sig: Take 1 tablet by mouth daily      Facility-Administered Medications: None       Past Medical History:   Diagnosis Date    Abdominal bruit     Anxiety     Aortic valve disorder     Depression     HTN (hypertension)     Hyperlipidemia     LBBB (left bundle branch block)     Mitral valve disorder     S/P CABG (coronary artery bypass graft)     Tremor     Tricuspid regurgitation        Past Surgical History:   Procedure Laterality Date    APPENDECTOMY      CARDIAC CATHETERIZATION      CHOLECYSTECTOMY      CORONARY ARTERY BYPASS GRAFT      HYSTERECTOMY         History reviewed  No pertinent family history  I have reviewed and agree with the history as documented  E-Cigarette/Vaping    E-Cigarette Use Never User      E-Cigarette/Vaping Substances     Social History     Tobacco Use    Smoking status: Never Smoker    Smokeless tobacco: Never Used   Substance Use Topics    Alcohol use: No    Drug use: No       Review of Systems   Constitutional: Negative for chills and fever  HENT: Negative for congestion, ear pain, rhinorrhea and sore throat  Eyes: Negative for pain and visual disturbance  Respiratory: Negative for cough, chest tightness, shortness of breath and wheezing      Cardiovascular: Negative for chest pain and palpitations  Gastrointestinal: Negative for abdominal pain, constipation, diarrhea, nausea and vomiting  Genitourinary: Negative for dysuria, flank pain, frequency and hematuria  Musculoskeletal: Positive for neck pain  Negative for back pain and neck stiffness  Skin: Negative for color change, pallor, rash and wound  Allergic/Immunologic: Negative for immunocompromised state  Neurological: Negative for dizziness, syncope, weakness, light-headedness, numbness and headaches  Hematological: Negative for adenopathy  Does not bruise/bleed easily  Psychiatric/Behavioral: Negative for confusion and decreased concentration  All other systems reviewed and are negative  Physical Exam  Physical Exam   Constitutional: She is oriented to person, place, and time  She appears well-developed and well-nourished  No distress  TRAUMA PRIMARY SURVEY:  Airway patent  Breath sounds equal and clear bilaterally  Circulation:  2+ pedal and radial pulses bilaterally  Disability:  GCS 15  Exposure completed  HENT:   Head: Normocephalic  Right Ear: External ear normal    Left Ear: External ear normal    Mouth/Throat: Oropharynx is clear and moist  No oropharyngeal exudate  Left occipital and parietal scalp hematoma, small to moderate in size  No scalp laceration or skin breakdown  Eyes: Pupils are equal, round, and reactive to light  Conjunctivae and EOM are normal    Neck: Normal range of motion  Neck supple  No JVD present  Cardiovascular: Normal rate, regular rhythm, normal heart sounds and intact distal pulses  Exam reveals no gallop and no friction rub  No murmur heard  Pulmonary/Chest: Effort normal and breath sounds normal  No respiratory distress  She has no wheezes  She has no rales  She exhibits no tenderness  Abdominal: Soft  Bowel sounds are normal  She exhibits no distension  There is no tenderness  There is no rebound and no guarding     Musculoskeletal: Normal range of motion  She exhibits no edema or tenderness  No midline cervical, thoracic or lumbar spine tenderness  Lymphadenopathy:     She has no cervical adenopathy  Neurological: She is alert and oriented to person, place, and time  No cranial nerve deficit  No gross motor or sensory deficits  5/5 strength throughout  Skin: Skin is warm and dry  No rash noted  She is not diaphoretic  No erythema  No pallor  Psychiatric: She has a normal mood and affect  Her behavior is normal    Nursing note and vitals reviewed  Vital Signs  ED Triage Vitals   Temperature Pulse Respirations Blood Pressure SpO2   03/16/20 1246 03/16/20 1246 03/16/20 1246 03/16/20 1246 03/16/20 1246   97 8 °F (36 6 °C) 70 20 134/68 98 %      Temp Source Heart Rate Source Patient Position - Orthostatic VS BP Location FiO2 (%)   03/16/20 1246 03/16/20 1246 -- -- --   Tympanic Monitor         Pain Score       03/16/20 1248       No Pain         Vitals:    03/16/20 1246 03/16/20 1249 03/16/20 1300   BP: 134/68  142/65   Pulse: 70  71   Resp: 20  18   Temp: 97 8 °F (36 6 °C)     TempSrc: Tympanic     SpO2: 98% 98% 99%   Weight: 63 5 kg (140 lb)     Height: 5' (1 524 m)         Visual Acuity  Visual Acuity      Most Recent Value   L Pupil Size (mm)  3   R Pupil Size (mm)  3          ED Medications  Medications   acetaminophen (TYLENOL) tablet 650 mg (650 mg Oral Given 3/16/20 1301)       Diagnostic Studies  Results Reviewed     None                 CT head without contrast   Final Result by Candice Noe DO (03/16 1319)      No acute intracranial abnormality  Workstation performed: [id number]         CT cervical spine without contrast   Final Result by Candice Noe DO (03/16 1327)      No cervical spine fracture or traumatic malalignment  Moderately severe multilevel cervical degenerative changes               Workstation performed: [id number]                    Procedures  Procedures         ED Course MDM  Number of Diagnoses or Management Options  Diagnosis management comments: 68-year-old female presents status post mechanical fall with head injury without loss of consciousness  Given that she is on anti-platelet agents, will obtain CT scan of the head and cervical spine given complaints of neck pain  Will give Tylenol for pain relief  Updated patient about normal CT scans  Recommended she follow up with PCP as needed and discussed ED return parameters  Amount and/or Complexity of Data Reviewed  Tests in the radiology section of CPT®: ordered and reviewed  Independent visualization of images, tracings, or specimens: yes          Disposition  Final diagnoses:   Fall, initial encounter   Minor head injury without loss of consciousness     Time reflects when diagnosis was documented in both MDM as applicable and the Disposition within this note     Time User Action Codes Description Comment    3/16/2020  1:51 PM Krista Kaye [Z32  XXXA] Fall, initial encounter     3/16/2020  1:51 PM Krista Kaye [[id number]] Minor head injury without loss of consciousness       ED Disposition     ED Disposition Condition Date/Time Comment    Discharge Stable Mon Mar 16, 2020  1:51 PM Cleopatra Osorio discharge to home/self care  Follow-up Information     Follow up With Specialties Details Why Contact Info Additional Information    Aranza Jimenes MD Internal Medicine Schedule an appointment as soon as possible for a visit  As needed [address] [facility] [address] [facility] [address] [facility] Emergency Department Emergency Medicine Go to  If symptoms worsen [address] [facility] [address], [address] [place], [place], [address]          Patient's Medications   Discharge Prescriptions    No medications on file     No discharge procedures on file      PDMP Review None          ED Provider  Electronically Signed by           Yris Zarate DO  03/16/20 7356

## 2020-06-17 ENCOUNTER — OFFICE VISIT (OUTPATIENT)
Dept: CARDIOLOGY CLINIC | Facility: CLINIC | Age: 75
End: 2020-06-17
Payer: COMMERCIAL

## 2020-06-17 VITALS
HEART RATE: 66 BPM | OXYGEN SATURATION: 98 % | TEMPERATURE: 98.4 F | WEIGHT: 136 LBS | HEIGHT: 60 IN | DIASTOLIC BLOOD PRESSURE: 64 MMHG | BODY MASS INDEX: 26.7 KG/M2 | SYSTOLIC BLOOD PRESSURE: 138 MMHG

## 2020-06-17 DIAGNOSIS — I70.1 RENAL ARTERY STENOSIS (HCC): ICD-10-CM

## 2020-06-17 DIAGNOSIS — I36.1 NONRHEUMATIC TRICUSPID VALVE REGURGITATION: ICD-10-CM

## 2020-06-17 DIAGNOSIS — I25.10 CORONARY ARTERY DISEASE INVOLVING NATIVE HEART WITHOUT ANGINA PECTORIS, UNSPECIFIED VESSEL OR LESION TYPE: Primary | ICD-10-CM

## 2020-06-17 DIAGNOSIS — I34.0 NONRHEUMATIC MITRAL VALVE REGURGITATION: ICD-10-CM

## 2020-06-17 DIAGNOSIS — I10 ESSENTIAL HYPERTENSION: ICD-10-CM

## 2020-06-17 DIAGNOSIS — Z95.1 HX OF CABG: ICD-10-CM

## 2020-06-17 DIAGNOSIS — I44.7 LBBB (LEFT BUNDLE BRANCH BLOCK): ICD-10-CM

## 2020-06-17 DIAGNOSIS — E78.2 MIXED HYPERLIPIDEMIA: ICD-10-CM

## 2020-06-17 DIAGNOSIS — I51.89 DIASTOLIC DYSFUNCTION: ICD-10-CM

## 2020-06-17 PROCEDURE — 99214 OFFICE O/P EST MOD 30 MIN: CPT | Performed by: INTERNAL MEDICINE

## 2020-08-31 ENCOUNTER — OFFICE VISIT (OUTPATIENT)
Dept: GASTROENTEROLOGY | Facility: CLINIC | Age: 75
End: 2020-08-31
Payer: COMMERCIAL

## 2020-08-31 ENCOUNTER — TELEPHONE (OUTPATIENT)
Dept: GASTROENTEROLOGY | Facility: CLINIC | Age: 75
End: 2020-08-31

## 2020-08-31 VITALS
WEIGHT: 138 LBS | HEART RATE: 67 BPM | TEMPERATURE: 97.6 F | SYSTOLIC BLOOD PRESSURE: 120 MMHG | RESPIRATION RATE: 18 BRPM | DIASTOLIC BLOOD PRESSURE: 68 MMHG | HEIGHT: 60 IN | BODY MASS INDEX: 27.09 KG/M2

## 2020-08-31 DIAGNOSIS — R10.84 GENERALIZED ABDOMINAL PAIN: ICD-10-CM

## 2020-08-31 DIAGNOSIS — R19.7 DIARRHEA, UNSPECIFIED TYPE: ICD-10-CM

## 2020-08-31 DIAGNOSIS — R11.2 NAUSEA AND VOMITING, INTRACTABILITY OF VOMITING NOT SPECIFIED, UNSPECIFIED VOMITING TYPE: ICD-10-CM

## 2020-08-31 DIAGNOSIS — K21.9 GASTROESOPHAGEAL REFLUX DISEASE WITHOUT ESOPHAGITIS: Primary | ICD-10-CM

## 2020-08-31 PROCEDURE — 99213 OFFICE O/P EST LOW 20 MIN: CPT | Performed by: PHYSICIAN ASSISTANT

## 2020-08-31 RX ORDER — METHYLPREDNISOLONE 4 MG/1
TABLET ORAL
COMMUNITY
Start: 2020-08-18 | End: 2020-08-31

## 2020-08-31 RX ORDER — ASCORBIC ACID 500 MG
500 TABLET ORAL DAILY
COMMUNITY

## 2020-08-31 RX ORDER — PANTOPRAZOLE SODIUM 40 MG/1
40 TABLET, DELAYED RELEASE ORAL DAILY
Qty: 30 TABLET | Refills: 1 | Status: SHIPPED | OUTPATIENT
Start: 2020-08-31 | End: 2020-10-27

## 2020-08-31 NOTE — PROGRESS NOTES
[facility] - Outpatient Follow-up Note  Shawna Nelson 66 y o  female MRN: [id number]  Encounter: [id number]          ASSESSMENT AND PLAN:      1  Nausea and vomiting  2  Gastroesophageal reflux disease  3  Generalized abdominal pain  4  Diarrhea    Patient presents for an evaluation of nausea/vomiting, heartburn, abdominal pain, and intermittent diarrhea x several months  She has a history of gastritis  EGD and colonoscopy to investigate - rule out gastritis, PUD, h pylori, celiac, IBD, malignancy, microscopic colitis  Will begin a PPI course with Pantoprazole 40mg po daily x 8 weeks  Recommend to begin a trial of a Probiotic like Align or Florastor and the fiber supplement Benefiber  Follow up after EGD/colonoscopy   ______________________________________________________________________    SUBJECTIVE:  Patient is a 64-year-old female who presents to the office for an evaluation of gastrointestinal complaints  Patient reports that for the past several months she has been struggling with intermittent diarrhea  She also reports of abdominal cramping  Furthermore, she reports she has been having intermittent nausea with occasional vomiting and frequent heartburn and belching  She denies melena or rectal bleeding  She does have a history of erosive gastritis on EGD in 2016  She reports her last colonoscopy was quite a few years ago  She denies any family history of colon cancer  She does admit to taking frequent NSAIDs  She also does report increased stress and anxiety with the pandemic as well as her daughter being diagnosed with breast cancer  REVIEW OF SYSTEMS IS OTHERWISE NEGATIVE        Historical Information   Past Medical History:   Diagnosis Date    Abdominal bruit     Anxiety     Aortic valve disorder     Depression     HTN (hypertension)     Hyperlipidemia     LBBB (left bundle branch block)     Mitral valve disorder     S/P CABG (coronary artery bypass graft)     Tremor     Tricuspid regurgitation      Past Surgical History:   Procedure Laterality Date    APPENDECTOMY      CARDIAC CATHETERIZATION      CHOLECYSTECTOMY      CORONARY ARTERY BYPASS GRAFT      HYSTERECTOMY       Social History   Social History     Substance and Sexual Activity   Alcohol Use No     Social History     Substance and Sexual Activity   Drug Use No     Social History     Tobacco Use   Smoking Status Never Smoker   Smokeless Tobacco Never Used     History reviewed  No pertinent family history  Meds/Allergies       Current Outpatient Medications:     ascorbic acid (VITAMIN C) 500 mg tablet    aspirin (ASPIR-81) 81 mg EC tablet    atenolol (TENORMIN) 50 mg tablet    cholecalciferol (VITAMIN D3) 1,000 units tablet    lisinopril-hydrochlorothiazide (PRINZIDE,ZESTORETIC) 20-25 MG per tablet    Omega 3 1200 MG CAPS    simvastatin (ZOCOR) 40 mg tablet    LORazepam (ATIVAN) 1 mg tablet    pantoprazole (PROTONIX) 40 mg tablet    Allergies   Allergen Reactions    Primidone      Other reaction(s): (PIMS) depression           Objective     Blood pressure 120/68, pulse 67, temperature 97 6 °F (36 4 °C), resp  rate 18, height 5' (1 524 m), weight 62 6 kg (138 lb)  Body mass index is 26 95 kg/m²  PHYSICAL EXAM:      General Appearance:   Alert, cooperative, no distress   HEENT:   Normocephalic, atraumatic, anicteric    Neck:  Supple, symmetrical, trachea midline   Lungs:   Clear to auscultation bilaterally; no rales, rhonchi or wheezing; respirations unlabored    Heart[de-identified]   Regular rate and rhythm; no murmur, rub, or gallop     Abdomen:   Soft, non-tender, non-distended; normal bowel sounds; no masses, no organomegaly    Genitalia:   Deferred    Rectal:   Deferred    Extremities:  No cyanosis, clubbing or edema    Pulses:  2+ and symmetric    Skin:  No jaundice, rashes, or lesions    Lymph nodes:  No palpable cervical lymphadenopathy        Lab Results:   No visits with results within 1 Day(s) from this visit  Latest known visit with results is:   No results found for any previous visit  Radiology Results:   No results found

## 2020-10-15 ENCOUNTER — TELEPHONE (OUTPATIENT)
Dept: CARDIOLOGY CLINIC | Facility: CLINIC | Age: 75
End: 2020-10-15

## 2020-10-27 ENCOUNTER — OFFICE VISIT (OUTPATIENT)
Dept: CARDIOLOGY CLINIC | Facility: CLINIC | Age: 75
End: 2020-10-27
Payer: COMMERCIAL

## 2020-10-27 VITALS
DIASTOLIC BLOOD PRESSURE: 78 MMHG | SYSTOLIC BLOOD PRESSURE: 138 MMHG | BODY MASS INDEX: 27.09 KG/M2 | HEART RATE: 76 BPM | WEIGHT: 138 LBS | OXYGEN SATURATION: 99 % | TEMPERATURE: 97.9 F | HEIGHT: 60 IN

## 2020-10-27 DIAGNOSIS — I51.89 DIASTOLIC DYSFUNCTION: ICD-10-CM

## 2020-10-27 DIAGNOSIS — Z95.1 HX OF CABG: ICD-10-CM

## 2020-10-27 DIAGNOSIS — I36.1 NONRHEUMATIC TRICUSPID VALVE REGURGITATION: ICD-10-CM

## 2020-10-27 DIAGNOSIS — I34.0 NONRHEUMATIC MITRAL VALVE REGURGITATION: ICD-10-CM

## 2020-10-27 DIAGNOSIS — I10 ESSENTIAL HYPERTENSION: ICD-10-CM

## 2020-10-27 DIAGNOSIS — E78.2 MIXED HYPERLIPIDEMIA: ICD-10-CM

## 2020-10-27 DIAGNOSIS — I25.10 CORONARY ARTERY DISEASE INVOLVING NATIVE HEART WITHOUT ANGINA PECTORIS, UNSPECIFIED VESSEL OR LESION TYPE: Primary | ICD-10-CM

## 2020-10-27 DIAGNOSIS — I44.7 LBBB (LEFT BUNDLE BRANCH BLOCK): ICD-10-CM

## 2020-10-27 PROCEDURE — 99214 OFFICE O/P EST MOD 30 MIN: CPT | Performed by: INTERNAL MEDICINE

## 2020-10-27 RX ORDER — LISINOPRIL 10 MG/1
TABLET ORAL
COMMUNITY
Start: 2020-10-19 | End: 2020-11-19 | Stop reason: SDUPTHER

## 2020-10-27 RX ORDER — DULOXETIN HYDROCHLORIDE 30 MG/1
CAPSULE, DELAYED RELEASE ORAL
COMMUNITY
Start: 2020-10-19

## 2020-10-27 RX ORDER — MECLIZINE HYDROCHLORIDE 25 MG/1
25 TABLET ORAL EVERY 8 HOURS PRN
COMMUNITY
Start: 2020-10-18 | End: 2021-10-18

## 2020-11-09 ENCOUNTER — LAB REQUISITION (OUTPATIENT)
Dept: LAB | Facility: HOSPITAL | Age: 75
End: 2020-11-09
Payer: COMMERCIAL

## 2020-11-09 DIAGNOSIS — K52.839 MICROSCOPIC COLITIS, UNSPECIFIED: ICD-10-CM

## 2020-11-09 PROCEDURE — 88342 IMHCHEM/IMCYTCHM 1ST ANTB: CPT | Performed by: PATHOLOGY

## 2020-11-09 PROCEDURE — 88305 TISSUE EXAM BY PATHOLOGIST: CPT | Performed by: PATHOLOGY

## 2020-11-12 ENCOUNTER — TELEPHONE (OUTPATIENT)
Dept: GASTROENTEROLOGY | Facility: CLINIC | Age: 75
End: 2020-11-12

## 2020-11-13 ENCOUNTER — TELEPHONE (OUTPATIENT)
Dept: GASTROENTEROLOGY | Facility: CLINIC | Age: 75
End: 2020-11-13

## 2020-11-17 DIAGNOSIS — K21.9 GASTROESOPHAGEAL REFLUX DISEASE WITHOUT ESOPHAGITIS: ICD-10-CM

## 2020-11-17 RX ORDER — PANTOPRAZOLE SODIUM 40 MG/1
TABLET, DELAYED RELEASE ORAL
Qty: 30 TABLET | Refills: 0 | Status: SHIPPED | OUTPATIENT
Start: 2020-11-17 | End: 2021-01-22

## 2020-11-19 DIAGNOSIS — E78.2 MIXED HYPERLIPIDEMIA: ICD-10-CM

## 2020-11-19 DIAGNOSIS — I10 ESSENTIAL HYPERTENSION: Primary | ICD-10-CM

## 2020-11-19 RX ORDER — LISINOPRIL 10 MG/1
10 TABLET ORAL DAILY
Qty: 90 TABLET | Refills: 3 | Status: SHIPPED | OUTPATIENT
Start: 2020-11-19 | End: 2021-12-09 | Stop reason: SDUPTHER

## 2020-11-19 RX ORDER — LISINOPRIL 10 MG/1
10 TABLET ORAL DAILY
Qty: 90 TABLET | Refills: 3 | Status: CANCELLED
Start: 2020-11-19

## 2020-11-19 RX ORDER — SIMVASTATIN 40 MG
20 TABLET ORAL DAILY
Qty: 90 TABLET | Refills: 3 | Status: SHIPPED | OUTPATIENT
Start: 2020-11-19 | End: 2021-12-09 | Stop reason: DRUGHIGH

## 2020-12-03 ENCOUNTER — TELEPHONE (OUTPATIENT)
Dept: GASTROENTEROLOGY | Facility: CLINIC | Age: 75
End: 2020-12-03

## 2020-12-03 DIAGNOSIS — R19.7 DIARRHEA, UNSPECIFIED TYPE: Primary | ICD-10-CM

## 2020-12-04 ENCOUNTER — LAB (OUTPATIENT)
Dept: LAB | Facility: CLINIC | Age: 75
End: 2020-12-04
Payer: COMMERCIAL

## 2020-12-04 DIAGNOSIS — R19.7 DIARRHEA, UNSPECIFIED TYPE: Primary | ICD-10-CM

## 2020-12-04 PROCEDURE — 87209 SMEAR COMPLEX STAIN: CPT

## 2020-12-04 PROCEDURE — 87177 OVA AND PARASITES SMEARS: CPT

## 2020-12-04 PROCEDURE — 87505 NFCT AGENT DETECTION GI: CPT

## 2020-12-05 ENCOUNTER — TRANSCRIBE ORDERS (OUTPATIENT)
Dept: LAB | Facility: CLINIC | Age: 75
End: 2020-12-05

## 2020-12-05 ENCOUNTER — APPOINTMENT (OUTPATIENT)
Dept: LAB | Facility: CLINIC | Age: 75
End: 2020-12-05
Payer: COMMERCIAL

## 2020-12-05 DIAGNOSIS — R19.7 DIARRHEA OF PRESUMED INFECTIOUS ORIGIN: Primary | ICD-10-CM

## 2020-12-05 DIAGNOSIS — R19.7 DIARRHEA OF PRESUMED INFECTIOUS ORIGIN: ICD-10-CM

## 2020-12-05 LAB
CAMPYLOBACTER DNA SPEC NAA+PROBE: NORMAL
SALMONELLA DNA SPEC QL NAA+PROBE: NORMAL
SHIGA TOXIN STX GENE SPEC NAA+PROBE: NORMAL
SHIGELLA DNA SPEC QL NAA+PROBE: NORMAL

## 2020-12-05 PROCEDURE — 87493 C DIFF AMPLIFIED PROBE: CPT

## 2020-12-06 LAB — C DIFF TOX B TCDB STL QL NAA+PROBE: NEGATIVE

## 2020-12-07 LAB — O+P STL CONC: NORMAL

## 2020-12-08 ENCOUNTER — TELEPHONE (OUTPATIENT)
Dept: GASTROENTEROLOGY | Facility: CLINIC | Age: 75
End: 2020-12-08

## 2020-12-08 LAB — G LAMBLIA AG STL QL IA: NEGATIVE

## 2021-01-15 ENCOUNTER — HOSPITAL ENCOUNTER (OUTPATIENT)
Dept: NON INVASIVE DIAGNOSTICS | Facility: CLINIC | Age: 76
Discharge: HOME | End: 2021-01-15
Payer: COMMERCIAL

## 2021-01-15 DIAGNOSIS — I70.1 RENAL ARTERY STENOSIS (HCC): ICD-10-CM

## 2021-01-15 PROCEDURE — 93978 VASCULAR STUDY: CPT | Performed by: INTERNAL MEDICINE

## 2021-01-15 PROCEDURE — 93978 VASCULAR STUDY: CPT

## 2021-01-22 ENCOUNTER — OFFICE VISIT (OUTPATIENT)
Dept: CARDIOLOGY CLINIC | Facility: CLINIC | Age: 76
End: 2021-01-22
Payer: COMMERCIAL

## 2021-01-22 VITALS
DIASTOLIC BLOOD PRESSURE: 74 MMHG | SYSTOLIC BLOOD PRESSURE: 130 MMHG | HEIGHT: 60 IN | OXYGEN SATURATION: 97 % | WEIGHT: 134 LBS | HEART RATE: 70 BPM | BODY MASS INDEX: 26.31 KG/M2

## 2021-01-22 DIAGNOSIS — I34.0 NONRHEUMATIC MITRAL VALVE REGURGITATION: ICD-10-CM

## 2021-01-22 DIAGNOSIS — Z95.1 HX OF CABG: ICD-10-CM

## 2021-01-22 DIAGNOSIS — I70.1 RENAL ARTERY STENOSIS (HCC): ICD-10-CM

## 2021-01-22 DIAGNOSIS — I44.7 LBBB (LEFT BUNDLE BRANCH BLOCK): ICD-10-CM

## 2021-01-22 DIAGNOSIS — I51.89 DIASTOLIC DYSFUNCTION: ICD-10-CM

## 2021-01-22 DIAGNOSIS — E78.2 MIXED HYPERLIPIDEMIA: ICD-10-CM

## 2021-01-22 DIAGNOSIS — I25.10 CORONARY ARTERY DISEASE INVOLVING NATIVE HEART WITHOUT ANGINA PECTORIS, UNSPECIFIED VESSEL OR LESION TYPE: Primary | ICD-10-CM

## 2021-01-22 DIAGNOSIS — I10 ESSENTIAL HYPERTENSION: ICD-10-CM

## 2021-01-22 DIAGNOSIS — I36.1 NONRHEUMATIC TRICUSPID VALVE REGURGITATION: ICD-10-CM

## 2021-01-22 PROCEDURE — 99214 OFFICE O/P EST MOD 30 MIN: CPT | Performed by: INTERNAL MEDICINE

## 2021-01-22 NOTE — PROGRESS NOTES
CARDIOLOGY OFFICE VISIT  [facility]  [facility], [facility], [facility], [facility] [place] Earl  Tel: [phone]      NAME: Elle Patel  AGE: 66 y o  SEX: female  : 1945   MRN: [id number]      Chief Complaint:  Chief Complaint   Patient presents with    Follow-up         History of Present Illness:   Patient comes for follow up  As usual she is an anxious person  However she denies chest pain / pressure, SOB, palpitations, lightheadedness, syncope, swelling feet, orthopnea, PND, claudication  CAD s/p CABG  -  States is doing well cardiac wise with no cardiac symptoms  Taking all medications regularly  Has not used SL NTG since the last clinic visit  HTN, diastolic dysfunction -  Has been hypertensive for many years  Taking medications regularly  Denies lightheadedness, headache, medication side effects  HLP -  Has had hyperlipidemia for many years  Taking statin regularly along with diet control  Denies myalgia  PCP closely monitoring the blood work  LBBB - Patient has history of left bundle branch block, no history of bradycardia or heart block no arrhythmic symptoms syncope lightheadedness dizziness exercise intolerance      MR, TR -  Stable   Mild   Asymptomatic     Patient has a long-standing history of anxiety and depressive tendencies      Past Medical History:  Past Medical History:   Diagnosis Date    Abdominal bruit     Anxiety     Aortic valve disorder     Depression     HTN (hypertension)     Hyperlipidemia     LBBB (left bundle branch block)     Mitral valve disorder     S/P CABG (coronary artery bypass graft)     Tremor     Tricuspid regurgitation          Past Surgical History:  Past Surgical History:   Procedure Laterality Date    APPENDECTOMY      CARDIAC CATHETERIZATION      CHOLECYSTECTOMY      CORONARY ARTERY BYPASS GRAFT      HYSTERECTOMY           Family History:  Noncontributory      Social History:  Social History     Socioeconomic History    Marital status:       Spouse name: None    Number of children: None    Years of education: None    Highest education level: None   Occupational History    None   Social Needs    Financial resource strain: None    Food insecurity     Worry: None     Inability: None    Transportation needs     Medical: None     Non-medical: None   Tobacco Use    Smoking status: Never Smoker    Smokeless tobacco: Never Used   Substance and Sexual Activity    Alcohol use: No    Drug use: No    Sexual activity: None   Lifestyle    Physical activity     Days per week: None     Minutes per session: None    Stress: None   Relationships    Social connections     Talks on phone: None     Gets together: None     Attends Scientologist service: None     Active member of club or organization: None     Attends meetings of clubs or organizations: None     Relationship status: None    Intimate partner violence     Fear of current or ex partner: None     Emotionally abused: None     Physically abused: None     Forced sexual activity: None   Other Topics Concern    None   Social History Narrative    None         Active Problems:  Patient Active Problem List   Diagnosis    Coronary artery disease involving native heart without angina pectoris    Hx of CABG    Essential hypertension    Mixed hyperlipidemia    LBBB (left bundle branch block)    Diastolic dysfunction    Nonrheumatic mitral valve regurgitation    Nonrheumatic tricuspid valve regurgitation    Renal artery stenosis (HCC)         The following portions of the patient's history were reviewed and updated as appropriate: past medical history, past surgical history, past family history,  past social history, current medications, allergies and problem list       Review of Systems:  Constitutional: Denies fever, chills  Eyes: Denies eye redness, eye discharge  ENT: Denies hearing loss, sneezing, nasal discharge, sore throat   Respiratory: Denies cough, expectoration, hemoptysis, shortness of breath  Cardiovascular: Denies chest pain, palpitations, orthopnea, PND, lower extremity swelling  Gastrointestinal: Denies abdominal pain, nausea, vomiting, hematemesis, diarrhea, bloody stools  Genito-Urinary: Denies dysuria, incontinence  Musculoskeletal: Denies joint pain, muscle pain  Neurologic: Denies lightheadedness, syncope, headache, seizures  Endocrine: Denies polydipsia, temperature intolerance  Allergy and Immunology: Denies hives, insect bite sensitivity  Hematological and Lymphatic: Denies bleeding problems, swollen glands   Psychological: +depression, +anxiety  Dermatological: Denies pruritus, rash, skin lesion changes      Vitals:  Vitals:    01/22/21 1027   BP: 130/74   Pulse: 70   SpO2: 97%       Body mass index is 26 17 kg/m²  Weight (last 2 days)     Date/Time   Weight    01/22/21 1027   60 8 (134)                Physical Examination:  General: Patient is not in acute distress  Awake, alert, oriented in time, place and person  Responding to commands  Head: Normocephalic  Atraumatic  Eyes: Both pupils normal sized, round and reactive to light  Nonicteric  ENT: Normal external ear canals  Neck: Supple  JVP not raised  Trachea central  No thyromegaly  Lungs: Bilateral bronchovascular breath sounds with no crackles or rhonchi  Chest wall: No tenderness  Cardiovascular: RRR  S1 and S2 normal  No murmur, rub or gallop  Gastrointestinal: Abdomen soft, nontender  No guarding or rigidity  Liver and spleen not palpable  Bowel sounds present  Neurologic: Patient is awake, alert, oriented in time, place and person  Responding to command  Moving all extremities  Integumentary:  No skin rash  Lymphatic: No cervical lymphadenopathy  Back: Symmetric   No CVA tenderness  Extremities: No clubbing, cyanosis or edema      Laboratory Results:    Lab Results   Component Value Date    HGBA1C 5 9 (H) 2020       Cardiac testing:   Results for orders placed during the hospital encounter of 17   Echo complete with contrast if indicated    Narrative [address], [address] [facility]  [phone]    Transthoracic Echocardiogram  2D, M-mode, and Color Doppler    Study date:  28-Sep-2017    Patient: Calvin Irwin  MR number: [id number]  Account number: [de-identified]  : 1945  Age: 74 years  Gender: Female  Status: Outpatient  Location: [facility]  Height: 61 in  Weight: 141 lb  BP: 138/ 70 mmHg    Indications: CAD  Diagnoses: I25 10 - Atherosclerotic heart disease of native coronary artery without angina pectoris    Sonographer:  Cano RCS  Interpreting Physician:  Qiana Layton MD  Primary Physician:  Deepa Goodman MD  Referring Physician:  Bindu Phipps MD  Group:  [facility]    LEFT VENTRICLE:  Systolic function was normal  Ejection fraction was estimated in the range of 55 % to 65 %  There were no regional wall motion abnormalities  Doppler parameters were consistent with abnormal left ventricular relaxation (grade 1 diastolic dysfunction)  MITRAL VALVE:  There was mild regurgitation  AORTIC VALVE:  There was trace regurgitation  TRICUSPID VALVE:  There was mild regurgitation  PULMONIC VALVE:  There was mild regurgitation  HISTORY: PRIOR HISTORY: Left bundle branch block  Hyperlipidemia  Risk factors: hypertension, medication-treated hypercholesterolemia, and a family history of coronary artery disease  Chronic lung disease  PROCEDURE: The study was performed in the [address] [place] [address]  This was a routine study  The transthoracic approach was used  The study included complete 2D imaging, M-mode, and color Doppler  The heart rate was 93 bpm, at the  start of the study   Images were obtained from the parasternal, apical, subcostal, and suprasternal notch acoustic windows  Image quality was adequate  LEFT VENTRICLE: Size was normal  Systolic function was normal  Ejection fraction was estimated in the range of 55 % to 65 %  There were no regional wall motion abnormalities  Wall thickness was normal  DOPPLER: Doppler parameters were  consistent with abnormal left ventricular relaxation (grade 1 diastolic dysfunction)  RIGHT VENTRICLE: The size was normal  Systolic function was normal  Wall thickness was normal     LEFT ATRIUM: Size was normal     RIGHT ATRIUM: Size was normal     MITRAL VALVE: Valve structure was normal  There was normal leaflet separation  DOPPLER: The transmitral velocity was within the normal range  There was no evidence for stenosis  There was mild regurgitation  AORTIC VALVE: The valve was trileaflet  Leaflets exhibited normal thickness and normal cuspal separation  DOPPLER: Transaortic velocity was within the normal range  There was no evidence for stenosis  There was trace regurgitation  TRICUSPID VALVE: The valve structure was normal  There was normal leaflet separation  DOPPLER: The transtricuspid velocity was within the normal range  There was no evidence for stenosis  There was mild regurgitation  PULMONIC VALVE: Leaflets exhibited normal thickness, no calcification, and normal cuspal separation  DOPPLER: The transpulmonic velocity was within the normal range  There was mild regurgitation  PERICARDIUM: There was no pericardial effusion  The pericardium was normal in appearance  AORTA: The root exhibited normal size  SYSTEMIC VEINS: IVC: The inferior vena cava was normal in size and course  Respirophasic changes were normal     SYSTEM MEASUREMENT TABLES    Apical four chamber  4 chamber Left Atrium Volume Index; Planimetry; End Systole; Apical four chamber;: 12 71 cm2  Left Ventricular Diastolic Area; Method of Disks, Single Plane; End Diastole;  Apical four chamber;: 24 72 cm2  Left Ventricular Ejection Fraction; Method of Disks, Single Plane; Apical four chamber;: 62 2 %  Left Ventricular systolic Area; Method of Disks, Single Plane; End Systole; Apical four chamber;: 12 99 cm2  Right Atrium Systolic Area; Planimetry; End Systole; Apical four chamber;: 12 63 cm2  Right Ventricular Internal Diastolic Dimension; End Diastole; Apical four chamber;: 26 mm  TAPSE: 15 1 mm    Unspecified Scan Mode  Aortic Root Diameter; End Systole;: 30 5 mm  Aortic valve Area; Continuity Equation by Velocity Time Integral; Systole;: 2 65 cm2  Cardiovascular Orifice Diameter; End Systole;: 19 7 mm  Gradient Pressure, Peak; Simplified Bernoulli; Antegrade Flow; Systole;: 8 5 mm[Hg]  Gradient pressure, average; Simplified Bernoulli; Antegrade Flow; Systole;: 4 7 mm[Hg]  Left Atrium to Aortic Root Ratio;: 1 2  Left atrial diameter; End Diastole;: 36 5 mm  Cardiac Output; Teichholz; Systole;: 2 21 L/min  Heart rate; Teichholz;: 86/min  Interventricular Septum Diastolic Thickness; Teichholz; End Diastole;: 8 9 mm  Left Ventricle Internal End Diastolic Dimension; Teichholz;: 32 5 mm  Left Ventricle Internal Systolic Dimension; Teichholz; End Systole;: 22 3 mm  Left Ventricle Mass; Mass AVCube with Teichholz; End Diastole;: 84 g  Left Ventricle Posterior Wall Diastolic Thickness; Teichholz; End Diastole;: 9 8 mm  Left Ventricular Ejection Fraction; Teichholz;: 60 5 %  Left Ventricular End Diastolic Volume; Teichholz;: 42 5 ml  Left Ventricular End Systolic Volume; Teichholz;: 16 8 ml  Left Ventricular Fractional Shortening;: 31 4 %  Stroke volume;  Teichholz; Systole;: 25 7 ml  Mitral Valve Area; Area by Pressure Half-Time; Systole;: 5 cm2  Mitral Valve E to A Ratio; Systole;: 0 67  Pressure half time; Diastole;: 0 04 s  Maximum Tricuspid valve regurgitation pressure gradient; Regurgitant Flow; Systole;: 23 2 mm[Hg]    [facility] Accredited Echocardiography Laboratory    Prepared and electronically signed by    Issac Woodard  Ava Galeazzi, MD  Signed 28-Sep-2017 17:54:05         Medications:    Current Outpatient Medications:     ascorbic acid (VITAMIN C) 500 mg tablet, Take 500 mg by mouth daily, Disp: , Rfl:     aspirin (ASPIR-81) 81 mg EC tablet, Take 1 tablet by mouth daily, Disp: , Rfl:     atenolol (TENORMIN) 50 mg tablet, Take 50 mg by mouth, Disp: , Rfl:     cholecalciferol (VITAMIN D3) 1,000 units tablet, Take 1,000 Units by mouth daily, Disp: , Rfl:     lisinopril (ZESTRIL) 10 mg tablet, Take 1 tablet (10 mg total) by mouth daily, Disp: 90 tablet, Rfl: 3    LORazepam (ATIVAN) 1 mg tablet, Take 1 mg by mouth, Disp: , Rfl:     Omega 3 1200 MG CAPS, Take by mouth, Disp: , Rfl:     simvastatin (ZOCOR) 40 mg tablet, Take 0 5 tablets (20 mg total) by mouth daily, Disp: 90 tablet, Rfl: 3    DULoxetine (CYMBALTA) 30 mg delayed release capsule, , Disp: , Rfl:     meclizine (ANTIVERT) 25 mg tablet, Take 25 mg by mouth every 8 (eight) hours as needed, Disp: , Rfl:       Allergies: Allergies   Allergen Reactions    Primidone      Other reaction(s): (PIMS) depression         Assessment and Plan:  1  Coronary artery disease S/P CABG  Stable  Doing well  Continue aspirin, beta-blocker, Ace inhibitor, statin    2  Essential hypertension, Diastolic dysfunction  BP stable  Continue current medications  3  Mixed hyperlipidemia  Continue statin and diet control  Her PCP closely monitor the blood work    4  LBBB (left bundle branch block)  Known finding    5  Nonrheumatic mitral valve regurgitation, Nonrheumatic tricuspid valve regurgitation  Follow-up echocardiogram scheduled    6  Bilateral renal artery stenosis  Follow-up with vascular surgery    Recommend aggressive risk factor modification and therapeutic lifestyle changes  Low-salt, low-calorie, low-fat, low-cholesterol diet with regular exercise and to optimize weight    I will defer the ordering and monitoring of necessity lab studies to you, but I am available and happy to review and manage any of the data at your request in the future  Discussed concepts of atherosclerosis, including signs and symptoms of cardiac disease  Previous studies were reviewed  Safety measures were reviewed  Questions were entertained and answered  Patient was advised to report any problems requiring medical attention  Follow-up with PCP and appropriate specialist and lab work as discussed  Return for follow up visit as scheduled or earlier, if needed  Thank you for allowing me to participate in the care and evaluation of your patient  Should you have any questions, please feel free to contact me        Collette Dumont MD  1/22/2021,10:38 AM

## 2021-02-12 DIAGNOSIS — Z23 ENCOUNTER FOR IMMUNIZATION: ICD-10-CM

## 2021-02-25 ENCOUNTER — HOSPITAL ENCOUNTER (OUTPATIENT)
Dept: NON INVASIVE DIAGNOSTICS | Facility: CLINIC | Age: 76
Discharge: HOME | End: 2021-02-25
Payer: COMMERCIAL

## 2021-02-25 DIAGNOSIS — I36.1 NONRHEUMATIC TRICUSPID VALVE REGURGITATION: ICD-10-CM

## 2021-02-25 DIAGNOSIS — I34.0 NONRHEUMATIC MITRAL VALVE REGURGITATION: ICD-10-CM

## 2021-02-25 PROCEDURE — 93306 TTE W/DOPPLER COMPLETE: CPT | Performed by: INTERNAL MEDICINE

## 2021-02-25 PROCEDURE — 93306 TTE W/DOPPLER COMPLETE: CPT

## 2021-03-02 ENCOUNTER — TELEPHONE (OUTPATIENT)
Dept: CARDIOLOGY CLINIC | Facility: CLINIC | Age: 76
End: 2021-03-02

## 2021-03-02 NOTE — TELEPHONE ENCOUNTER
----- Message from Carmelo Alvarado MD sent at 3/1/2021  6:55 PM EST -----  Please call and inform the patient that the echo showed  moderate regurgitation  ( There was mild regurgitation by previous echocardiograms)

## 2021-03-02 NOTE — TELEPHONE ENCOUNTER
----- Message from Clare Corona MD sent at 3/1/2021  6:55 PM EST -----  Please call and inform the patient that the echo showed  moderate regurgitation  ( There was mild regurgitation by previous echocardiograms)

## 2021-05-20 ENCOUNTER — OFFICE VISIT (OUTPATIENT)
Dept: CARDIOLOGY CLINIC | Facility: CLINIC | Age: 76
End: 2021-05-20
Payer: COMMERCIAL

## 2021-05-20 VITALS
SYSTOLIC BLOOD PRESSURE: 136 MMHG | OXYGEN SATURATION: 97 % | BODY MASS INDEX: 26.03 KG/M2 | WEIGHT: 132.6 LBS | HEART RATE: 67 BPM | HEIGHT: 60 IN | DIASTOLIC BLOOD PRESSURE: 68 MMHG

## 2021-05-20 DIAGNOSIS — I25.10 CORONARY ARTERY DISEASE INVOLVING NATIVE HEART WITHOUT ANGINA PECTORIS, UNSPECIFIED VESSEL OR LESION TYPE: Primary | ICD-10-CM

## 2021-05-20 DIAGNOSIS — E78.2 MIXED HYPERLIPIDEMIA: ICD-10-CM

## 2021-05-20 DIAGNOSIS — I70.1 RENAL ARTERY STENOSIS (HCC): ICD-10-CM

## 2021-05-20 DIAGNOSIS — Z95.1 HX OF CABG: ICD-10-CM

## 2021-05-20 DIAGNOSIS — I36.1 NONRHEUMATIC TRICUSPID VALVE REGURGITATION: ICD-10-CM

## 2021-05-20 DIAGNOSIS — I34.0 NONRHEUMATIC MITRAL VALVE REGURGITATION: ICD-10-CM

## 2021-05-20 DIAGNOSIS — I44.7 LBBB (LEFT BUNDLE BRANCH BLOCK): ICD-10-CM

## 2021-05-20 DIAGNOSIS — I10 ESSENTIAL HYPERTENSION: ICD-10-CM

## 2021-05-20 DIAGNOSIS — I51.89 DIASTOLIC DYSFUNCTION: ICD-10-CM

## 2021-05-20 PROCEDURE — 99214 OFFICE O/P EST MOD 30 MIN: CPT | Performed by: INTERNAL MEDICINE

## 2021-05-20 NOTE — PROGRESS NOTES
CARDIOLOGY OFFICE VISIT  [facility]  [address], [place], [place]  Tel: [phone]      NAME: Charles Qureshi  AGE: 66 y o  SEX: female  : 1945   MRN: [id number]      Chief Complaint:  Chief Complaint   Patient presents with    Follow-up         History of Present Illness:   Patient comes for follow up  States he is doing well from cardiac stand point and denies chest pain / pressure, SOB, palpitations, lightheadedness, syncope, swelling feet, orthopnea, PND, claudication  CAD s/p CABG  -  States is doing well cardiac wise with no cardiac symptoms  Taking all medications regularly  Has not used SL NTG since the last clinic visit  HTN, Diastolic dysfunction -  Has been hypertensive for many years  Taking medications regularly  Denies lightheadedness, headache, medication side effects  HLP -  Has had hyperlipidemia for many years  Taking statin regularly along with diet control  Denies myalgia  PCP closely monitoring the blood work  LBBB - Patient has history of left bundle branch block, no history of bradycardia or heart block no arrhythmic symptoms syncope lightheadedness dizziness exercise intolerance      MR, TR -  Stable   Mild   Asymptomatic     Patient has a long-standing history of anxiety and depressive tendencies      Past Medical History:  Past Medical History:   Diagnosis Date    Abdominal bruit     Anxiety     Aortic valve disorder     Depression     HTN (hypertension)     Hyperlipidemia     LBBB (left bundle branch block)     Mitral valve disorder     S/P CABG (coronary artery bypass graft)     Tremor     Tricuspid regurgitation          Past Surgical History:  Past Surgical History:   Procedure Laterality Date    APPENDECTOMY      CARDIAC CATHETERIZATION      CHOLECYSTECTOMY      CORONARY ARTERY BYPASS GRAFT      HYSTERECTOMY           Family History:  History reviewed  No pertinent family history  Social History:  Social History     Socioeconomic History    Marital status:       Spouse name: None    Number of children: None    Years of education: None    Highest education level: None   Occupational History    None   Social Needs    Financial resource strain: None    Food insecurity     Worry: None     Inability: None    Transportation needs     Medical: None     Non-medical: None   Tobacco Use    Smoking status: Never Smoker    Smokeless tobacco: Never Used   Substance and Sexual Activity    Alcohol use: No    Drug use: No    Sexual activity: None   Lifestyle    Physical activity     Days per week: None     Minutes per session: None    Stress: None   Relationships    Social connections     Talks on phone: None     Gets together: None     Attends [employer] service: None     Active member of club or organization: None     Attends meetings of clubs or organizations: None     Relationship status: None    Intimate partner violence     Fear of current or ex partner: None     Emotionally abused: None     Physically abused: None     Forced sexual activity: None   Other Topics Concern    None   Social History Narrative    None         Active Problems:  Patient Active Problem List   Diagnosis    Coronary artery disease involving native heart without angina pectoris    Hx of CABG    Essential hypertension    Mixed hyperlipidemia    LBBB (left bundle branch block)    Diastolic dysfunction    Nonrheumatic mitral valve regurgitation    Nonrheumatic tricuspid valve regurgitation    Renal artery stenosis (HCC)         The following portions of the patient's history were reviewed and updated as appropriate: past medical history, past surgical history, past family history,  past social history, current medications, allergies and problem list       Review of Systems:  Constitutional: Denies fever, chills  Eyes: Denies eye redness, eye discharge  ENT: Denies hearing loss, tinnitus, sneezing, nasal discharge, sore throat   Respiratory: Denies cough, expectoration, hemoptysis, shortness of breath  Cardiovascular: Denies chest pain, palpitations, orthopnea, PND, lower extremity swelling  Gastrointestinal: Denies abdominal pain, nausea, vomiting, hematemesis, diarrhea, bloody stools  Genito-Urinary: Denies dysuria, incontinence  Musculoskeletal: Denies back pain, joint pain, muscle pain  Neurologic: Denies lightheadedness, syncope, headache, seizures  Endocrine: Denies polydipsia, temperature intolerance  Allergy and Immunology: Denies hives, insect bite sensitivity  Hematological and Lymphatic: Denies bleeding problems, swollen glands   Psychological: +depression, +anxiety  Dermatological: Denies pruritus, rash, skin lesion changes      Vitals:  Vitals:    05/20/21 1036   BP: 136/68   Pulse: 67   SpO2: 97%       Body mass index is 25 9 kg/m²  Weight (last 2 days)     Date/Time   Weight    05/20/21 1036   60 1 (132 6)                Physical Examination:  General: Patient is not in acute distress  Awake, alert, oriented in time, place and person  Responding to commands  Head: Normocephalic  Atraumatic  Eyes: Both pupils normal sized, round and reactive to light  Nonicteric  ENT: Normal external ear canals  Neck: Supple  JVP not raised  Trachea central  No thyromegaly  Lungs: Bilateral bronchovascular breath sounds with no crackles or rhonchi  Chest wall: No tenderness  Cardiovascular: RRR  S1 and S2 normal  No murmur, rub or gallop  Gastrointestinal: Abdomen soft, nontender  No guarding or rigidity  Liver and spleen not palpable  Bowel sounds present  Neurologic: Patient is awake, alert, oriented in time, place and person  Responding to command  Moving all extremities  Integumentary:  No skin rash  Lymphatic: No cervical lymphadenopathy  Back: Symmetric   No CVA tenderness  Extremities: No clubbing, cyanosis or edema      Laboratory Results:  CBC with diff:   No results found for: WBC, RBC, HGB, HCT, MCV, MCH, RDW, PLT    CMP:  No results found for: CREATININE, BUN, NA, K, CL, CO2, GLUCOSE, PROT, ALKPHOS, ALT, AST, BILIDIR    Lab Results   Component Value Date    HGBA1C 6 2 (H) 2021       No results found for: TROPONINI, CKMB, CKTOTAL    Lipid Profile:   No results found for: CHOL  No results found for: HDL  No results found for: LDLCALC  No results found for: TRIG    Cardiac testing:   Results for orders placed during the hospital encounter of 21   Echo complete with contrast if indicated    Narrative [facility], [address] [facility]  [phone]    Transthoracic Echocardiogram  2D, M-mode, Doppler, and Color Doppler    Study date:  2021    Patient: Isamar Bailey  MR number: [id number]  Account number: [de-identified]  : 1945  Age: 66 years  Gender: Female  Status: Outpatient  Location: [facility]  Height: 60 in  Weight: 133 8 lb  BP: 130/ 74 mmHg    Indications: Mitral Regurgitation  Diagnoses: I34 0 - Nonrheumatic mitral (valve) insufficiency    Sonographer:  SHAUN Ruvalcaba  Primary Physician:  Quiana Parmar MD  Referring Physician:  Blaze Koehler MD  Group:  [facility]  Interpreting Physician:  Antwan Tracey MD    SUMMARY    LEFT VENTRICLE:  Ejection fraction was estimated to be 55 %  Asynchronus septal motion likely secondary to underlying conduction system disorder  Although no diagnostic regional wall motion abnormality was identified, this possibility cannot be completely excluded on the basis of this study  RIGHT VENTRICLE:  Systolic function was mildly reduced  LEFT ATRIUM:  The atrium was mildly dilated  MITRAL VALVE:  There was at least moderate regurgitation  ( There was mild regurgitation by previous echocardiograms)  TRICUSPID VALVE:  There was mild regurgitation  PULMONIC VALVE:  There was mild regurgitation      HISTORY: PRIOR HISTORY: Tricuspid Regurgitation, Mitral Regurgitation, CAD, LBBB, H/O CABG, Diastolic Dysfunction, Hypertension  PROCEDURE: The study was performed in the [address] [place] [address]  This was a routine study  The transthoracic approach was used  The study included complete 2D imaging, M-mode, complete spectral Doppler, and color Doppler  The  heart rate was 66 bpm, at the start of the study  Images were obtained from the parasternal, apical, subcostal, and suprasternal notch acoustic windows  Image quality was adequate  LEFT VENTRICLE: Size was normal  Ejection fraction was estimated to be 55 %  Asynchronus septal motion likely secondary to underlying conduction system disorder  Although no diagnostic regional wall motion abnormality was identified, this  possibility cannot be completely excluded on the basis of this study  DOPPLER: There was an increased relative contribution of atrial contraction to ventricular filling  RIGHT VENTRICLE: The size was normal  Systolic function was mildly reduced  Wall thickness was normal     LEFT ATRIUM: The atrium was mildly dilated  RIGHT ATRIUM: Size was normal     MITRAL VALVE: There was annular calcification  DOPPLER: There was at least moderate regurgitation  ( There was mild regurgitation by previous echocardiograms)  AORTIC VALVE: The valve was not well visualized  DOPPLER: There was no evidence for stenosis  TRICUSPID VALVE: The valve structure was normal  There was normal leaflet separation  DOPPLER: The transtricuspid velocity was within the normal range  There was no evidence for stenosis  There was mild regurgitation  PULMONIC VALVE: Leaflets exhibited normal thickness, no calcification, and normal cuspal separation  DOPPLER: The transpulmonic velocity was within the normal range  There was mild regurgitation  PERICARDIUM: There was no pericardial effusion  The pericardium was normal in appearance      AORTA: The root exhibited normal size     SYSTEM MEASUREMENT TABLES    2D  %FS: 37 27 %  Ao Diam: 3 03 cm  EDV(Teich): 56 72 ml  EF(Teich): 68 15 %  ESV(Teich): 18 07 ml  IVSd: 0 89 cm  LA Area: 23 42 cm2  LA Diam: 4 05 cm  LVEDV MOD A4C: 60 4 ml  LVEF MOD A4C: 67 89 %  LVESV MOD A4C: 19 4 ml  LVIDd: 3 66 cm  LVIDs: 2 3 cm  LVLd A4C: 7 78 cm  LVLs A4C: 6 55 cm  LVPWd: 0 93 cm  RA Area: 16 83 cm2  RVIDd: 3 59 cm  SV MOD A4C: 41 ml  SV(Teich): 38 65 ml    CF  MR Als  Sherif: 0 34 m/s  MR Flow: 53 53 ml/s  MR Rad: 0 5 cm    CW  AV MaxP 38 mmHg  AV Vmax: 1 45 m/s  MR VTI: 262 21 cm  MR Vmax: 6 71 m/s  MR Vmean: 5 27 m/s  MR maxP 05 mmHg  MR meanP 21 mmHg  PRend P 86 mmHg  PRend Vmax: 1 31 m/s  TR MaxP 23 mmHg  TR Vmax: 2 88 m/s    MM  TAPSE: 1 68 cm    PW  E' Sept: 0 08 m/s  E/E' Sept: 9 65  LVOT Env  Ti: 327 31 ms  LVOT VTI: 24 67 cm  LVOT Vmax: 1 11 m/s  LVOT Vmean: 0 75 m/s  LVOT maxP 96 mmHg  LVOT meanP 51 mmHg  MR ERO: 0 08 cm2  MR RV: 20 93 ml  MV A Sherif: 0 48 m/s  MV Dec Iron: 4 16 m/s2  MV DecT: 192 25 ms  MV E Sherif: 0 8 m/s  MV E/A Ratio: 1 67  MV PHT: 55 75 ms  MVA By PHT: 3 95 cm2    [employer] Accredited Ech[facility]    Prepared and electronically signed by    Marissa Ascencio MD  Signed 2021 14:20:26         Medications:    Current Outpatient Medications:     aspirin (ASPIR-81) 81 mg EC tablet, Take 1 tablet by mouth daily, Disp: , Rfl:     atenolol (TENORMIN) 50 mg tablet, Take 50 mg by mouth, Disp: , Rfl:     cholecalciferol (VITAMIN D3) 1,000 units tablet, Take 1,000 Units by mouth daily, Disp: , Rfl:     lisinopril (ZESTRIL) 10 mg tablet, Take 1 tablet (10 mg total) by mouth daily, Disp: 90 tablet, Rfl: 3    Omega 3 1200 MG CAPS, Take by mouth, Disp: , Rfl:     sertraline (ZOLOFT) 100 mg tablet, Take 100 mg by mouth daily, Disp: , Rfl:     simvastatin (ZOCOR) 40 mg tablet, Take 0 5 tablets (20 mg total) by mouth daily, Disp: 90 tablet, Rfl: 3    ascorbic acid (VITAMIN C) 500 mg tablet, Take 500 mg by mouth daily, Disp: , Rfl:     DULoxetine (CYMBALTA) 30 mg delayed release capsule, , Disp: , Rfl:     LORazepam (ATIVAN) 1 mg tablet, Take 1 mg by mouth, Disp: , Rfl:     meclizine (ANTIVERT) 25 mg tablet, Take 25 mg by mouth every 8 (eight) hours as needed, Disp: , Rfl:       Allergies: Allergies   Allergen Reactions    Primidone      Other reaction(s): (PIMS) depression         Assessment and Plan:  1  Coronary artery disease S/P CABG  Stable  Doing well  Continue aspirin, beta-blocker, Ace inhibitor, statin     2  Essential hypertension, Diastolic dysfunction  BP stable  Continue current medications      3  Mixed hyperlipidemia  Continue statin and diet control  Her PCP closely monitor the blood work     4  LBBB (left bundle branch block)  Known finding     5  Nonrheumatic mitral valve regurgitation, Nonrheumatic tricuspid valve regurgitation  Follow-up echocardiogram at recommended intervals     6  Bilateral renal artery stenosis  Follow-up with vascular surgery    Recommend aggressive risk factor modification and therapeutic lifestyle changes  Low-salt, low-calorie, low-fat, low-cholesterol diet with regular exercise and to optimize weight  I will defer the ordering and monitoring of necessity lab studies to you, but I am available and happy to review and manage any of the data at your request in the future  Discussed concepts of atherosclerosis, including signs and symptoms of cardiac disease  Previous studies were reviewed  Safety measures were reviewed  Questions were entertained and answered  Patient was advised to report any problems requiring medical attention  Follow-up with PCP and appropriate specialist and lab work as discussed  Return for follow up visit as scheduled or earlier, if needed  Thank you for allowing me to participate in the care and evaluation of your patient    Should you have any questions, please feel free to contact me        Kristy Kawasaki, MD  5/20/2021,10:55 AM

## 2021-12-09 ENCOUNTER — OFFICE VISIT (OUTPATIENT)
Dept: CARDIOLOGY CLINIC | Facility: CLINIC | Age: 76
End: 2021-12-09
Payer: COMMERCIAL

## 2021-12-09 VITALS
HEART RATE: 61 BPM | HEIGHT: 60 IN | OXYGEN SATURATION: 97 % | WEIGHT: 143.6 LBS | DIASTOLIC BLOOD PRESSURE: 80 MMHG | BODY MASS INDEX: 28.19 KG/M2 | SYSTOLIC BLOOD PRESSURE: 162 MMHG

## 2021-12-09 DIAGNOSIS — I36.1 NONRHEUMATIC TRICUSPID VALVE REGURGITATION: ICD-10-CM

## 2021-12-09 DIAGNOSIS — I25.10 CORONARY ARTERY DISEASE INVOLVING NATIVE HEART WITHOUT ANGINA PECTORIS, UNSPECIFIED VESSEL OR LESION TYPE: Primary | ICD-10-CM

## 2021-12-09 DIAGNOSIS — E78.2 MIXED HYPERLIPIDEMIA: ICD-10-CM

## 2021-12-09 DIAGNOSIS — I51.89 DIASTOLIC DYSFUNCTION: ICD-10-CM

## 2021-12-09 DIAGNOSIS — I10 ESSENTIAL HYPERTENSION: ICD-10-CM

## 2021-12-09 DIAGNOSIS — I34.0 NONRHEUMATIC MITRAL VALVE REGURGITATION: ICD-10-CM

## 2021-12-09 DIAGNOSIS — I44.7 LBBB (LEFT BUNDLE BRANCH BLOCK): ICD-10-CM

## 2021-12-09 DIAGNOSIS — Z95.1 HX OF CABG: ICD-10-CM

## 2021-12-09 DIAGNOSIS — I70.1 RENAL ARTERY STENOSIS (HCC): ICD-10-CM

## 2021-12-09 PROCEDURE — 99214 OFFICE O/P EST MOD 30 MIN: CPT | Performed by: INTERNAL MEDICINE

## 2021-12-09 RX ORDER — LISINOPRIL 10 MG/1
15 TABLET ORAL DAILY
Qty: 135 TABLET | Refills: 3 | Status: SHIPPED | OUTPATIENT
Start: 2021-12-09 | End: 2022-04-14 | Stop reason: SDUPTHER

## 2021-12-09 RX ORDER — SIMVASTATIN 40 MG
40 TABLET ORAL DAILY
COMMUNITY
Start: 2021-11-04

## 2021-12-16 ENCOUNTER — CLINICAL SUPPORT (OUTPATIENT)
Dept: CARDIOLOGY CLINIC | Facility: CLINIC | Age: 76
End: 2021-12-16
Payer: COMMERCIAL

## 2021-12-16 VITALS
DIASTOLIC BLOOD PRESSURE: 58 MMHG | WEIGHT: 142 LBS | SYSTOLIC BLOOD PRESSURE: 118 MMHG | HEIGHT: 60 IN | HEART RATE: 65 BPM | BODY MASS INDEX: 27.88 KG/M2 | OXYGEN SATURATION: 96 % | RESPIRATION RATE: 16 BRPM

## 2021-12-16 DIAGNOSIS — I10 ESSENTIAL HYPERTENSION: Primary | ICD-10-CM

## 2021-12-16 PROCEDURE — 99211 OFF/OP EST MAY X REQ PHY/QHP: CPT

## 2022-04-14 ENCOUNTER — OFFICE VISIT (OUTPATIENT)
Dept: CARDIOLOGY CLINIC | Facility: CLINIC | Age: 77
End: 2022-04-14
Payer: COMMERCIAL

## 2022-04-14 VITALS
BODY MASS INDEX: 26.62 KG/M2 | RESPIRATION RATE: 16 BRPM | HEART RATE: 66 BPM | SYSTOLIC BLOOD PRESSURE: 140 MMHG | OXYGEN SATURATION: 96 % | HEIGHT: 61 IN | DIASTOLIC BLOOD PRESSURE: 58 MMHG | WEIGHT: 141 LBS

## 2022-04-14 DIAGNOSIS — I44.7 LBBB (LEFT BUNDLE BRANCH BLOCK): ICD-10-CM

## 2022-04-14 DIAGNOSIS — I25.10 CORONARY ARTERY DISEASE INVOLVING NATIVE HEART WITHOUT ANGINA PECTORIS, UNSPECIFIED VESSEL OR LESION TYPE: Primary | ICD-10-CM

## 2022-04-14 DIAGNOSIS — I34.0 NONRHEUMATIC MITRAL VALVE REGURGITATION: ICD-10-CM

## 2022-04-14 DIAGNOSIS — I51.89 DIASTOLIC DYSFUNCTION: ICD-10-CM

## 2022-04-14 DIAGNOSIS — Z95.1 HX OF CABG: ICD-10-CM

## 2022-04-14 DIAGNOSIS — I70.1 RENAL ARTERY STENOSIS (HCC): ICD-10-CM

## 2022-04-14 DIAGNOSIS — I36.1 NONRHEUMATIC TRICUSPID VALVE REGURGITATION: ICD-10-CM

## 2022-04-14 DIAGNOSIS — I10 ESSENTIAL HYPERTENSION: ICD-10-CM

## 2022-04-14 DIAGNOSIS — E78.2 MIXED HYPERLIPIDEMIA: ICD-10-CM

## 2022-04-14 PROCEDURE — 99214 OFFICE O/P EST MOD 30 MIN: CPT | Performed by: INTERNAL MEDICINE

## 2022-04-14 RX ORDER — LISINOPRIL 10 MG/1
10 TABLET ORAL DAILY
Start: 2022-04-14

## 2022-04-14 NOTE — PROGRESS NOTES
CARDIOLOGY OFFICE VISIT  [facility]  [address], [address], [address]  Tel: [phone]      NAME: Tana Morgan  AGE: 64 y o  SEX: female  : 1945   MRN: [id number]      Chief Complaint:  Chief Complaint   Patient presents with    Follow-up         History of Present Illness:   Patient comes for follow up  States she is doing well from cardiac stand point and denies chest pain / pressure, SOB, palpitations, lightheadedness, syncope, swelling feet, orthopnea, PND, claudication  CAD s/p CABG in  -  States is doing well cardiac wise with no cardiac symptoms  Taking all medications regularly  Has not used SL NTG since the last clinic visit  HTN, DD -  Has been hypertensive for many years  Taking medications regularly  Denies lightheadedness, headache, medication side effects  HLP -  Has had hyperlipidemia for many years  Taking statin regularly along with diet control  Denies myalgia  PCP closely monitoring the blood work  LBBB - Patient has history of left bundle branch block  No history of bradycardia or heart block, no arrhythmic symptoms, syncope, lightheadedness, dizziness, exercise intolerance      MR, TR -  Stable   Mild   Asymptomatic     Patient has a long-standing history of anxiety and depressive tendencies     Past Medical History:  Past Medical History:   Diagnosis Date    Abdominal bruit     Anxiety     Aortic valve disorder     Depression     HTN (hypertension)     Hyperlipidemia     LBBB (left bundle branch block)     Mitral valve disorder     S/P CABG (coronary artery bypass graft)     Tremor     Tricuspid regurgitation          Past Surgical History:  Past Surgical History:   Procedure Laterality Date    APPENDECTOMY      CARDIAC CATHETERIZATION      CHOLECYSTECTOMY      CORONARY ARTERY BYPASS GRAFT      HYSTERECTOMY           Family History:  Noncontributory      Social History:  Social History     Socioeconomic History    Marital status:       Spouse name: None    Number of children: None    Years of education: None    Highest education level: None   Occupational History    None   Tobacco Use    Smoking status: Never Smoker    Smokeless tobacco: Never Used   Vaping Use    Vaping Use: Never used   Substance and Sexual Activity    Alcohol use: No    Drug use: No    Sexual activity: None   Other Topics Concern    None   Social History Narrative    None     Social Determinants of Health     Financial Resource Strain: Not on file   Food Insecurity: Not on file   Transportation Needs: Not on file   Physical Activity: Not on file   Stress: Not on file   Social Connections: Not on file   Intimate Partner Violence: Not on file   Housing Stability: Not on file         Active Problems:  Patient Active Problem List   Diagnosis    Coronary artery disease involving native heart without angina pectoris    Hx of CABG    Essential hypertension    Mixed hyperlipidemia    LBBB (left bundle branch block)    Diastolic dysfunction    Nonrheumatic mitral valve regurgitation    Nonrheumatic tricuspid valve regurgitation    Renal artery stenosis (HCC)         The following portions of the patient's history were reviewed and updated as appropriate: past medical history, past surgical history, past family history,  past social history, current medications, allergies and problem list       Review of Systems:  Constitutional: Denies fever, chills  Eyes: Denies eye redness, eye discharge  ENT: Denies hearing loss, sneezing, nasal discharge, sore throat   Respiratory: Denies cough, expectoration, shortness of breath  Cardiovascular: Denies chest pain, palpitations, lower extremity swelling  Gastrointestinal: Denies abdominal pain, nausea, vomiting, diarrhea  Genito-Urinary: Denies dysuria, incontinence  Musculoskeletal: Denies back pain, joint pain, muscle pain  Neurologic: Denies lightheadedness, syncope, headache, seizures  Endocrine: Denies polydipsia, temperature intolerance  Allergy and Immunology: Denies hives, insect bite sensitivity  Hematological and Lymphatic: Denies bleeding problems, swollen glands   Psychological: +depression, +anxiety  Dermatological: Denies pruritus, rash, skin lesion changes      Vitals:  Vitals:    04/14/22 1147   BP: 140/58   Pulse: 66   Resp: 16   SpO2: 96%       Body mass index is 26 64 kg/m²  Weight (last 2 days)     Date/Time Weight    04/14/22 1147 64 (141)            Physical Examination:  General: Patient is not in acute distress  Awake, alert, oriented in time, place and person  Responding to commands  Head: Normocephalic  Atraumatic  Eyes: Both pupils normal sized, round and reactive to light  Nonicteric  ENT: Normal external ear canals  Neck: Supple  JVP not raised  Trachea central  No thyromegaly  Lungs: Bilateral bronchovascular breath sounds with no crackles or rhonchi  Chest wall: No tenderness  Cardiovascular: RRR  S1 and S2 normal  No murmur, rub or gallop  Gastrointestinal: Abdomen soft, nontender  No guarding or rigidity  Liver and spleen not palpable  Bowel sounds present  Neurologic: Patient is awake, alert, oriented in time, place and person  Responding to commands  Moving all extremities  Integumentary:  No skin rash  Lymphatic: No cervical lymphadenopathy  Back: Symmetric   No CVA tenderness  Extremities: No clubbing, cyanosis or edema      Laboratory Results:    Lab Results   Component Value Date    HGBA1C 6 1 (H) 10/25/2021       Cardiac testing:   Results for orders placed during the hospital encounter of 02/25/21    Echo complete with contrast if indicated    Narrative  [facility], [address] [facility]  [phone]    Transthoracic Echocardiogram  2D, M-mode, Doppler, and Color Doppler    Study date:  25-Feb-2021    Patient: Danielle Carter  MR number: [id number]  Account number: [de-identified]  : 1945  Age: 66 years  Gender: Female  Status: Outpatient  Location: [facility]  Height: 60 in  Weight: 133 8 lb  BP: 130/ 74 mmHg    Indications: Mitral Regurgitation  Diagnoses: I34 0 - Nonrheumatic mitral (valve) insufficiency    Sonographer:  SHAUN Pedro  Primary Physician:  Chaim Durbin MD  Referring Physician:  Collette Dumont MD  Group:  [facility]  Interpreting Physician:  Ivan Weiss MD    SUMMARY    LEFT VENTRICLE:  Ejection fraction was estimated to be 55 %  Asynchronus septal motion likely secondary to underlying conduction system disorder  Although no diagnostic regional wall motion abnormality was identified, this possibility cannot be completely excluded on the basis of this study  RIGHT VENTRICLE:  Systolic function was mildly reduced  LEFT ATRIUM:  The atrium was mildly dilated  MITRAL VALVE:  There was at least moderate regurgitation  ( There was mild regurgitation by previous echocardiograms)  TRICUSPID VALVE:  There was mild regurgitation  PULMONIC VALVE:  There was mild regurgitation  HISTORY: PRIOR HISTORY: Tricuspid Regurgitation, Mitral Regurgitation, CAD, LBBB, H/O CABG, Diastolic Dysfunction, Hypertension  PROCEDURE: The study was performed in the [address] [place] [address]  This was a routine study  The transthoracic approach was used  The study included complete 2D imaging, M-mode, complete spectral Doppler, and color Doppler  The  heart rate was 66 bpm, at the start of the study  Images were obtained from the parasternal, apical, subcostal, and suprasternal notch acoustic windows  Image quality was adequate  LEFT VENTRICLE: Size was normal  Ejection fraction was estimated to be 55 %  Asynchronus septal motion likely secondary to underlying conduction system disorder   Although no diagnostic regional wall motion abnormality was identified, this  possibility cannot be completely excluded on the basis of this study  DOPPLER: There was an increased relative contribution of atrial contraction to ventricular filling  RIGHT VENTRICLE: The size was normal  Systolic function was mildly reduced  Wall thickness was normal     LEFT ATRIUM: The atrium was mildly dilated  RIGHT ATRIUM: Size was normal     MITRAL VALVE: There was annular calcification  DOPPLER: There was at least moderate regurgitation  ( There was mild regurgitation by previous echocardiograms)  AORTIC VALVE: The valve was not well visualized  DOPPLER: There was no evidence for stenosis  TRICUSPID VALVE: The valve structure was normal  There was normal leaflet separation  DOPPLER: The transtricuspid velocity was within the normal range  There was no evidence for stenosis  There was mild regurgitation  PULMONIC VALVE: Leaflets exhibited normal thickness, no calcification, and normal cuspal separation  DOPPLER: The transpulmonic velocity was within the normal range  There was mild regurgitation  PERICARDIUM: There was no pericardial effusion  The pericardium was normal in appearance  AORTA: The root exhibited normal size  SYSTEM MEASUREMENT TABLES    2D  %FS: 37 27 %  Ao Diam: 3 03 cm  EDV(Teich): 56 72 ml  EF(Teich): 68 15 %  ESV(Teich): 18 07 ml  IVSd: 0 89 cm  LA Area: 23 42 cm2  LA Diam: 4 05 cm  LVEDV MOD A4C: 60 4 ml  LVEF MOD A4C: 67 89 %  LVESV MOD A4C: 19 4 ml  LVIDd: 3 66 cm  LVIDs: 2 3 cm  LVLd A4C: 7 78 cm  LVLs A4C: 6 55 cm  LVPWd: 0 93 cm  RA Area: 16 83 cm2  RVIDd: 3 59 cm  SV MOD A4C: 41 ml  SV(Teich): 38 65 ml    CF  MR Als  Sherif: 0 34 m/s  MR Flow: 53 53 ml/s  MR Rad: 0 5 cm    CW  AV MaxP 38 mmHg  AV Vmax: 1 45 m/s  MR VTI: 262 21 cm  MR Vmax: 6 71 m/s  MR Vmean: 5 27 m/s  MR maxP 05 mmHg  MR meanP 21 mmHg  PRend P 86 mmHg  PRend Vmax: 1 31 m/s  TR MaxP 23 mmHg  TR Vmax: 2 88 m/s    MM  TAPSE: 1 68 cm    PW  E' Sept: 0 08 m/s  E/E' Sept: 9 65  LVOT Env  Ti: 327 31 ms  LVOT VTI: 24 67 cm  LVOT Vmax: 1 11 m/s  LVOT Vmean: 0 75 m/s  LVOT maxP 96 mmHg  LVOT meanP 51 mmHg  MR ERO: 0 08 cm2  MR RV: 20 93 ml  MV A Sherif: 0 48 m/s  MV Dec [place]: 4 16 m/s2  MV DecT: 192 25 ms  MV E Sherif: 0 8 m/s  MV E/A Ratio: 1 67  MV PHT: 55 75 ms  MVA By PHT: 3 95 cm2    [facility]    Prepared and electronically signed by    Bebo Tate MD  Signed 2021 14:20:26      Medications:    Current Outpatient Medications:     ascorbic acid (VITAMIN C) 500 mg tablet, Take 500 mg by mouth daily, Disp: , Rfl:     aspirin (ASPIR-81) 81 mg EC tablet, Take 1 tablet by mouth daily, Disp: , Rfl:     atenolol (TENORMIN) 50 mg tablet, Take 50 mg by mouth, Disp: , Rfl:     cholecalciferol (VITAMIN D3) 1,000 units tablet, Take 1,000 Units by mouth daily, Disp: , Rfl:     DULoxetine (CYMBALTA) 30 mg delayed release capsule,  , Disp: , Rfl:     lisinopril (ZESTRIL) 10 mg tablet, Take 1 tablet (10 mg total) by mouth daily, Disp: , Rfl:     Omega 3 1200 MG CAPS, Take by mouth, Disp: , Rfl:     simvastatin (ZOCOR) 40 mg tablet, Take 40 mg by mouth daily  , Disp: , Rfl:       Allergies: Allergies   Allergen Reactions    Primidone      Other reaction(s): (PIMS) depression         Assessment and Plan:  1  Coronary artery disease S/P CABG  Stable   Doing well   Continue aspirin, beta-blocker, Ace inhibitor, statin     2  Essential hypertension, Diastolic dysfunction  BP acceptable  Cont current meds  Continue to monitor BP at home and call if abnormal     3  Mixed hyperlipidemia  Continue statin and diet control  Erskin Boas PCP closely monitors her blood work     4  LBBB (left bundle branch block)  Known finding     5   Nonrheumatic mitral valve regurgitation, Nonrheumatic tricuspid valve regurgitation  Follow-up echocardiogram at recommended intervals     6  Bilateral renal artery stenosis  Follow-up with vascular surgery    Recommend aggressive risk factor modification and therapeutic lifestyle changes  Low-salt, low-calorie, low-fat, low-cholesterol diet with regular exercise and to optimize weight  I will defer the ordering and monitoring of necessity lab studies to you, but I am available and happy to review and manage any of the data at your request in the future  Discussed concepts of atherosclerosis, including signs and symptoms of cardiac disease  Previous studies were reviewed  Safety measures were reviewed  Questions were entertained and answered  Patient was advised to report any problems requiring medical attention  Follow-up with PCP and appropriate specialist and lab work as discussed  Return for follow up visit as scheduled or earlier, if needed  Thank you for allowing me to participate in the care and evaluation of your patient  Should you have any questions, please feel free to contact me        Teresa Sanford MD  4/14/2022,12:06 PM

## 2022-04-26 ENCOUNTER — OFFICE VISIT (OUTPATIENT)
Dept: CARDIOLOGY CLINIC | Facility: CLINIC | Age: 77
End: 2022-04-26
Payer: COMMERCIAL

## 2022-04-26 VITALS — SYSTOLIC BLOOD PRESSURE: 166 MMHG | HEART RATE: 64 BPM | DIASTOLIC BLOOD PRESSURE: 94 MMHG

## 2022-04-26 DIAGNOSIS — I10 PRIMARY HYPERTENSION: Primary | ICD-10-CM

## 2022-04-26 PROCEDURE — 99211 OFF/OP EST MAY X REQ PHY/QHP: CPT | Performed by: INTERNAL MEDICINE

## 2022-04-26 NOTE — PROGRESS NOTES
Pt brought in digital BP, reviewed steps on how to perform BP at home with pts monitor  BP today 166/94    As per Dr Otis Torres, increase lisinopril from 10 mg to 15 mg  Pt to do record BP readings twice daily and report back readings in two weeks